# Patient Record
Sex: FEMALE | Race: WHITE | NOT HISPANIC OR LATINO | Employment: OTHER | URBAN - METROPOLITAN AREA
[De-identification: names, ages, dates, MRNs, and addresses within clinical notes are randomized per-mention and may not be internally consistent; named-entity substitution may affect disease eponyms.]

---

## 2019-12-19 ENCOUNTER — OFFICE VISIT (OUTPATIENT)
Dept: FAMILY MEDICINE CLINIC | Facility: CLINIC | Age: 69
End: 2019-12-19

## 2019-12-19 VITALS
BODY MASS INDEX: 21.94 KG/M2 | HEIGHT: 64 IN | SYSTOLIC BLOOD PRESSURE: 118 MMHG | OXYGEN SATURATION: 99 % | TEMPERATURE: 98 F | WEIGHT: 128.5 LBS | HEART RATE: 90 BPM | DIASTOLIC BLOOD PRESSURE: 70 MMHG

## 2019-12-19 DIAGNOSIS — R03.0 ELEVATED BLOOD-PRESSURE READING WITHOUT DIAGNOSIS OF HYPERTENSION: Primary | ICD-10-CM

## 2019-12-19 DIAGNOSIS — H65.01 NON-RECURRENT ACUTE SEROUS OTITIS MEDIA OF RIGHT EAR: ICD-10-CM

## 2019-12-19 DIAGNOSIS — R09.81 NASAL CONGESTION: ICD-10-CM

## 2019-12-19 PROCEDURE — 99203 OFFICE O/P NEW LOW 30 MIN: CPT | Performed by: FAMILY MEDICINE

## 2019-12-19 RX ORDER — FLUTICASONE PROPIONATE 50 MCG
2 SPRAY, SUSPENSION (ML) NASAL DAILY
Qty: 1 BOTTLE | Refills: 3 | Status: SHIPPED | OUTPATIENT
Start: 2019-12-19 | End: 2021-06-23

## 2019-12-19 RX ORDER — MULTIVIT WITH MINERALS/LUTEIN
1000 TABLET ORAL DAILY
COMMUNITY
End: 2022-12-08

## 2019-12-19 RX ORDER — MAGNESIUM ASCORBATE
POWDER (GRAM) MISCELLANEOUS
COMMUNITY

## 2019-12-19 RX ORDER — FOLIC ACID 0.8 MG
1000 TABLET ORAL
COMMUNITY
End: 2019-12-19

## 2019-12-19 NOTE — PROGRESS NOTES
Chief Complaint   Patient presents with   • Ear Fullness     some pain       Subjective   Zina Urban is a 69 y.o. female.     History of Present Illness     70 yo female here to establish care as a new patient     PMH of anemia and atrophic vaginitis. Not on meds. NKDA.      HM: C-scope 4 years ago with polyp adenoma, due for repeat. Normal mammogram in 2016, normal pap in 2015. No hx breast, colon, ovarian cancer. Non-smoker. Going through a divorce. 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     Had episode of elevated blood pressure with an ear infection, BL ears. SBP 150s. States BP rises when she has an infectino otherwise she is normotensive. Never had hx HTN, never been on a blood pressure medication.       Past Medical History:   Diagnosis Date   • Anemia        Past Surgical History:   Procedure Laterality Date   • OOPHORECTOMY         Family History   Problem Relation Age of Onset   • Heart failure Father    • Glaucoma Father    • Kidney disease Father    • Cancer Paternal Grandmother    • Hypertension Mother    • Depression Mother        Social History     Socioeconomic History   • Marital status:      Spouse name: Not on file   • Number of children: Not on file   • Years of education: Not on file   • Highest education level: Not on file   Tobacco Use   • Smoking status: Never Smoker   • Smokeless tobacco: Never Used   Substance and Sexual Activity   • Alcohol use: Yes     Alcohol/week: 3.0 standard drinks     Types: 3 Glasses of wine per week   • Drug use: No   • Sexual activity: Never       Current Outpatient Medications on File Prior to Visit   Medication Sig Dispense Refill   • ascorbic acid (VITAMIN C) 1000 MG tablet Take 1,000 mg by mouth Daily.     • aspirin 81 MG EC tablet Take 81 mg by mouth Daily. Taking once a week     • Magnesium Ascorbate powder      • Probiotic Product (PROBIOTIC PO) Take  by mouth.     • VITAMIN D PO Take  by mouth.     • [DISCONTINUED]  amoxicillin-clavulanate (AUGMENTIN) 875-125 MG per tablet Take 1 tablet by mouth 2 (Two) Times a Day. 20 tablet 0   • [DISCONTINUED] Magnesium 500 MG capsule Take 1,000 capsules by mouth.       No current facility-administered medications on file prior to visit.      The following portions of the patient's history were reviewed and updated as appropriate: allergies, current medications, past family history, past medical history, past social history, past surgical history and problem list.    Review of Systems   Constitutional: Negative for activity change, chills and fever.   HENT: Negative for congestion, postnasal drip and rhinorrhea.         R ear fullness   Eyes: Negative for blurred vision and pain.   Respiratory: Negative for cough, chest tightness and shortness of breath.    Cardiovascular: Negative for chest pain.   Gastrointestinal: Negative for abdominal pain, constipation, diarrhea, nausea and vomiting.   Endocrine: Negative for cold intolerance and heat intolerance.   Genitourinary: Negative for decreased urine volume, dysuria and frequency.   Musculoskeletal: Negative for arthralgias, back pain and myalgias.   Skin: Negative for rash and skin lesions.   Neurological: Negative for dizziness and confusion.   Psychiatric/Behavioral: Negative for agitation, behavioral problems and depressed mood.           Vitals:    12/19/19 1309   BP: 118/70   Pulse: 90   Temp: 98 °F (36.7 °C)   SpO2: 99%      Objective   Physical Exam   Constitutional: She is oriented to person, place, and time. She appears well-developed and well-nourished.   HENT:   Head: Normocephalic and atraumatic.   Right Ear: External ear normal.   Left Ear: External ear normal.   Nose: Nose normal.   Mouth/Throat: No oropharyngeal exudate.   Eyes: Pupils are equal, round, and reactive to light. Conjunctivae and EOM are normal.   Neck: Neck supple.   Cardiovascular: Normal rate, regular rhythm and normal heart sounds.   No murmur  heard.  Pulmonary/Chest: Effort normal and breath sounds normal. No respiratory distress.   Abdominal: Soft. Bowel sounds are normal.   Musculoskeletal: Normal range of motion.   Neurological: She is alert and oriented to person, place, and time.   Skin: Skin is warm and dry.   Psychiatric: She has a normal mood and affect.   Nursing note and vitals reviewed.        Assessment/Plan   Problem List Items Addressed This Visit     None      Visit Diagnoses     Nasal congestion    -  Primary    Relevant Medications    fluticasone (FLONASE) 50 MCG/ACT nasal spray    Non-recurrent acute serous otitis media of right ear        Relevant Medications    fluticasone (FLONASE) 50 MCG/ACT nasal spray    Elevated blood-pressure reading without diagnosis of hypertension            -HM: Due for labs as well as mammogram and c-scope, declining this today.       -Flonase for nasal congestion. Completed course of Augmentin, no otitis media on exam today. IF feeling of ear fullness does not continue to resolve will refer to ENT   -Monitor BP at home, normotensive today, will watch       Return if symptoms worsen or fail to improve.      Fidelina Richey MD

## 2020-06-19 ENCOUNTER — OFFICE VISIT (OUTPATIENT)
Dept: FAMILY MEDICINE CLINIC | Facility: CLINIC | Age: 70
End: 2020-06-19

## 2020-06-19 VITALS
BODY MASS INDEX: 23.73 KG/M2 | SYSTOLIC BLOOD PRESSURE: 135 MMHG | TEMPERATURE: 98.4 F | WEIGHT: 139 LBS | DIASTOLIC BLOOD PRESSURE: 80 MMHG | HEART RATE: 80 BPM | HEIGHT: 64 IN | OXYGEN SATURATION: 97 %

## 2020-06-19 DIAGNOSIS — D64.9 ANEMIA, UNSPECIFIED TYPE: ICD-10-CM

## 2020-06-19 DIAGNOSIS — Z00.00 ENCOUNTER FOR MEDICARE ANNUAL WELLNESS EXAM: Primary | ICD-10-CM

## 2020-06-19 DIAGNOSIS — R53.83 OTHER FATIGUE: ICD-10-CM

## 2020-06-19 DIAGNOSIS — Z12.31 ENCOUNTER FOR SCREENING MAMMOGRAM FOR MALIGNANT NEOPLASM OF BREAST: ICD-10-CM

## 2020-06-19 DIAGNOSIS — I10 ESSENTIAL HYPERTENSION: ICD-10-CM

## 2020-06-19 DIAGNOSIS — Z00.00 HEALTHCARE MAINTENANCE: ICD-10-CM

## 2020-06-19 DIAGNOSIS — E78.2 MIXED HYPERLIPIDEMIA: ICD-10-CM

## 2020-06-19 LAB
BILIRUB BLD-MCNC: NEGATIVE MG/DL
CLARITY, POC: CLEAR
COLOR UR: YELLOW
GLUCOSE UR STRIP-MCNC: NEGATIVE MG/DL
KETONES UR QL: NEGATIVE
LEUKOCYTE EST, POC: NEGATIVE
NITRITE UR-MCNC: NEGATIVE MG/ML
PH UR: 5 [PH] (ref 5–8)
PROT UR STRIP-MCNC: NEGATIVE MG/DL
RBC # UR STRIP: NEGATIVE /UL
SP GR UR: 1.02 (ref 1–1.03)
UROBILINOGEN UR QL: NORMAL

## 2020-06-19 PROCEDURE — G0439 PPPS, SUBSEQ VISIT: HCPCS | Performed by: FAMILY MEDICINE

## 2020-06-19 PROCEDURE — 81002 URINALYSIS NONAUTO W/O SCOPE: CPT | Performed by: FAMILY MEDICINE

## 2020-06-19 NOTE — PROGRESS NOTES
The ABCs of the Annual Wellness Visit  Subsequent Medicare Wellness Visit    Chief Complaint   Patient presents with   • Annual Exam     Sub AWV       Subjective   History of Present Illness:  Zina Urban is a 69 y.o. female who presents for a Subsequent Medicare Wellness Visit.      70 yo female here for AMW visit     HM: Due for labs as well as mammogram and c-scope     PMH of anemia and atrophic vaginitis.  Also has a history of hyperlipidemia not on medication.  Has had prn elevated blood pressures but never been on a blood pressure medication.  She is typically normotensive.  Not on meds. NKDA.    HM: C-scope 5 years ago with polyp tubular adenoma, due for repeat now.  She declines any mammogram.  She also declines a colonoscopy.  She is going through a stressful time with her divorce and does not want any of these preventative measures at this time.  She does not want any prescription medications either.    Last normal mammogram in 2016, normal pap in 2015. No hx breast, colon, ovarian cancer. Non-smoker. Going through a divorce. 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     Blood pressure today was mildly elevated initially 140/80 repeat check was 135 over 80s.    HEALTH RISK ASSESSMENT    Recent Hospitalizations:  No hospitalization(s) within the last year.    Current Medical Providers:  Patient Care Team:  Fidelina Richey MD as PCP - General (Family Medicine)    Smoking Status:  Social History     Tobacco Use   Smoking Status Never Smoker   Smokeless Tobacco Never Used       Alcohol Consumption:  Social History     Substance and Sexual Activity   Alcohol Use Yes   • Alcohol/week: 3.0 standard drinks   • Types: 3 Glasses of wine per week       Depression Screen:   PHQ-2/PHQ-9 Depression Screening 6/19/2020   Little interest or pleasure in doing things 1   Feeling down, depressed, or hopeless 1   Trouble falling or staying asleep, or sleeping too much 0   Feeling tired or having little energy 0    Poor appetite or overeating 2   Feeling bad about yourself - or that you are a failure or have let yourself or your family down 0   Trouble concentrating on things, such as reading the newspaper or watching television 0   Moving or speaking so slowly that other people could have noticed. Or the opposite - being so fidgety or restless that you have been moving around a lot more than usual 0   Thoughts that you would be better off dead, or of hurting yourself in some way 0   Total Score 4   If you checked off any problems, how difficult have these problems made it for you to do your work, take care of things at home, or get along with other people? Not difficult at all       Fall Risk Screen:  DESIRE Fall Risk Assessment was completed, and patient is at LOW risk for falls.Assessment completed on:6/19/2020    Health Habits and Functional and Cognitive Screening:  Functional & Cognitive Status 6/19/2020   Do you have difficulty preparing food and eating? No   Do you have difficulty bathing yourself, getting dressed or grooming yourself? No   Do you have difficulty using the toilet? No   Do you have difficulty moving around from place to place? No   Do you have trouble with steps or getting out of a bed or a chair? No   Current Diet Well Balanced Diet   Dental Exam Up to date   Eye Exam Up to date   Exercise (times per week) 7 times per week   Current Exercise Activities Include Walking   Do you need help using the phone?  No   Are you deaf or do you have serious difficulty hearing?  No   Do you need help with transportation? No   Do you need help shopping? No   Do you need help preparing meals?  No   Do you need help with housework?  No   Do you need help with laundry? No   Do you need help taking your medications? No   Do you need help managing money? No   Do you ever drive or ride in a car without wearing a seat belt? No   Have you felt unusual stress, anger or loneliness in the last month? Yes   Who do you live  with? Alone   If you need help, do you have trouble finding someone available to you? No   Do you have difficulty concentrating, remembering or making decisions? No         Does the patient have evidence of cognitive impairment? No    Asprin use counseling:Taking ASA appropriately as indicated    Age-appropriate Screening Schedule:  Refer to the list below for future screening recommendations based on patient's age, sex and/or medical conditions. Orders for these recommended tests are listed in the plan section. The patient has been provided with a written plan.    Health Maintenance   Topic Date Due   • MAMMOGRAM  1950   • LIPID PANEL  06/19/2020   • TDAP/TD VACCINES (2 - Td) 04/17/2027   • COLONOSCOPY  05/03/2028   • INFLUENZA VACCINE  Discontinued   • ZOSTER VACCINE  Discontinued          The following portions of the patient's history were reviewed and updated as appropriate: problem list.    Outpatient Medications Prior to Visit   Medication Sig Dispense Refill   • ascorbic acid (VITAMIN C) 1000 MG tablet Take 1,000 mg by mouth Daily.     • aspirin 81 MG EC tablet Take 81 mg by mouth Daily. Taking once a week     • fluticasone (FLONASE) 50 MCG/ACT nasal spray 2 sprays into the nostril(s) as directed by provider Daily. 1 bottle 3   • Magnesium Ascorbate powder      • Probiotic Product (PROBIOTIC PO) Take  by mouth.     • VITAMIN D PO Take  by mouth.       No facility-administered medications prior to visit.        There is no problem list on file for this patient.      Advanced Care Planning:  ACP discussion was declined by the patient. Patient does not have an advance directive, information provided.    Review of Systems   Constitutional: Negative for chills and fever.   HENT: Negative for congestion.    Respiratory: Negative for chest tightness and wheezing.    Cardiovascular: Negative for chest pain and palpitations.   Gastrointestinal: Negative for abdominal pain, nausea and vomiting.   Endocrine:  "Negative for polyuria.   Genitourinary: Negative for frequency, genital sores and vaginal discharge.   Musculoskeletal: Negative for arthralgias.   Skin: Negative for rash.   Neurological: Negative for dizziness and weakness.   Psychiatric/Behavioral: Negative for agitation, decreased concentration and dysphoric mood.       Compared to one year ago, the patient feels her physical health is the same.  Compared to one year ago, the patient feels her mental health is the same.    Reviewed chart for potential of high risk medication in the elderly: yes  Reviewed chart for potential of harmful drug interactions in the elderly:yes    Objective         Vitals:    06/19/20 1121 06/19/20 1145   BP: 142/82 135/80   Pulse: 81 80   Temp: 98.4 °F (36.9 °C)    SpO2: 97%    Weight: 63 kg (139 lb)    Height: 162.6 cm (64\")        Body mass index is 23.86 kg/m².  Discussed the patient's BMI with her. The BMI is in the acceptable range.    Physical Exam   Constitutional: She is oriented to person, place, and time. She appears well-developed and well-nourished. No distress.   HENT:   Head: Normocephalic.   Right Ear: External ear normal.   Left Ear: External ear normal.   Mouth/Throat: Oropharynx is clear and moist.   Eyes: Pupils are equal, round, and reactive to light. EOM are normal.   Cardiovascular: Normal rate and regular rhythm.   Pulmonary/Chest: Effort normal and breath sounds normal. No respiratory distress. She has no wheezes.   Abdominal: Soft. Bowel sounds are normal. She exhibits no distension.   Musculoskeletal: Normal range of motion.   Neurological: She is alert and oriented to person, place, and time.   Skin: Skin is warm and dry. Capillary refill takes less than 2 seconds. She is not diaphoretic.             Assessment/Plan   Medicare Risks and Personalized Health Plan  CMS Preventative Services Quick Reference  Chronic Pain   Colon Cancer Screening  Dementia/Memory   Depression/Dysphoria  Fall Risk  Immunizations " Discussed/Encouraged (specific immunizations; Shingrix )  Inadequate Social Support, Isolation, Loneliness, Lack of Transportation, Financial Difficulties, or Caregiver Stress   Inactivity/Sedentary  Obesity/Overweight   Osteoprorosis Risk    The above risks/problems have been discussed with the patient.  Pertinent information has been shared with the patient in the After Visit Summary.  Follow up plans and orders are seen below in the Assessment/Plan Section.    Diagnoses and all orders for this visit:    1. Encounter for Medicare annual wellness exam (Primary)    2. Mixed hyperlipidemia  -     Vitamin B12  -     Folate  -     CBC & Differential  -     Comprehensive metabolic panel  -     Lipid panel  -     T4  -     TSH    3. Other fatigue  -     Vitamin B12  -     Folate  -     CBC & Differential  -     Comprehensive metabolic panel  -     Lipid panel  -     T4  -     TSH    4. Anemia, unspecified type  -     Vitamin B12  -     Folate  -     CBC & Differential  -     Comprehensive metabolic panel  -     Lipid panel  -     T4  -     TSH    5. Essential hypertension  -     Vitamin B12  -     Folate  -     CBC & Differential  -     Comprehensive metabolic panel  -     Lipid panel  -     T4  -     TSH    6. Healthcare maintenance  -     POC Urinalysis Dipstick      Follow Up:  We will get labs has not had labs in over several years.  Also having some chronic complaints of fatigue and the past history of anemia and hyperlipidemia.  She has also had 1 or 2 mildly elevated blood pressures without a diagnosis of hypertension.  On recheck she was normotensive.    She declines colonoscopy, mammogram, any prescription medications.    She is going through a hard time with her divorce.  She tries to stay socially active and physically active by walking every day and eating and healthy diet.  She visits with her grandchildren at least once a week.  Her mood is stable.    An After Visit Summary and PPPS were given to the  patient.

## 2020-06-19 NOTE — PROGRESS NOTES
ASA        70 yo female here to establish care as a new patient     HM: Due for labs as well as mammogram and c-scope, declining this today.     PMH of anemia and atrophic vaginitis. Not on meds. NKDA    HM: C-scope 4 years ago with polyp adenoma, due for repeat. Normal mammogram in 2016, normal pap in 2015. No hx breast, colon, ovarian cancer. Non-smoker. Going through a divorce. 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        Hx elevated BP without HTN

## 2020-06-20 LAB
ALBUMIN SERPL-MCNC: 4.5 G/DL (ref 3.5–5.2)
ALBUMIN/GLOB SERPL: 2 G/DL
ALP SERPL-CCNC: 64 U/L (ref 39–117)
ALT SERPL-CCNC: 23 U/L (ref 1–33)
AST SERPL-CCNC: 25 U/L (ref 1–32)
BASOPHILS # BLD AUTO: 0.05 10*3/MM3 (ref 0–0.2)
BASOPHILS NFR BLD AUTO: 0.7 % (ref 0–1.5)
BILIRUB SERPL-MCNC: 0.4 MG/DL (ref 0.2–1.2)
BUN SERPL-MCNC: 14 MG/DL (ref 8–23)
BUN/CREAT SERPL: 19.7 (ref 7–25)
CALCIUM SERPL-MCNC: 9.5 MG/DL (ref 8.6–10.5)
CHLORIDE SERPL-SCNC: 101 MMOL/L (ref 98–107)
CHOLEST SERPL-MCNC: 273 MG/DL (ref 0–200)
CO2 SERPL-SCNC: 27.8 MMOL/L (ref 22–29)
CREAT SERPL-MCNC: 0.71 MG/DL (ref 0.57–1)
EOSINOPHIL # BLD AUTO: 0.11 10*3/MM3 (ref 0–0.4)
EOSINOPHIL NFR BLD AUTO: 1.5 % (ref 0.3–6.2)
ERYTHROCYTE [DISTWIDTH] IN BLOOD BY AUTOMATED COUNT: 12.8 % (ref 12.3–15.4)
FOLATE SERPL-MCNC: 12.6 NG/ML (ref 4.78–24.2)
GLOBULIN SER CALC-MCNC: 2.2 GM/DL
GLUCOSE SERPL-MCNC: 88 MG/DL (ref 65–99)
HCT VFR BLD AUTO: 39.9 % (ref 34–46.6)
HDLC SERPL-MCNC: 93 MG/DL (ref 40–60)
HGB BLD-MCNC: 13.1 G/DL (ref 12–15.9)
IMM GRANULOCYTES # BLD AUTO: 0.01 10*3/MM3 (ref 0–0.05)
IMM GRANULOCYTES NFR BLD AUTO: 0.1 % (ref 0–0.5)
LDLC SERPL CALC-MCNC: 169 MG/DL (ref 0–100)
LYMPHOCYTES # BLD AUTO: 1.59 10*3/MM3 (ref 0.7–3.1)
LYMPHOCYTES NFR BLD AUTO: 21.2 % (ref 19.6–45.3)
MCH RBC QN AUTO: 27.6 PG (ref 26.6–33)
MCHC RBC AUTO-ENTMCNC: 32.8 G/DL (ref 31.5–35.7)
MCV RBC AUTO: 84.2 FL (ref 79–97)
MONOCYTES # BLD AUTO: 0.5 10*3/MM3 (ref 0.1–0.9)
MONOCYTES NFR BLD AUTO: 6.7 % (ref 5–12)
NEUTROPHILS # BLD AUTO: 5.24 10*3/MM3 (ref 1.7–7)
NEUTROPHILS NFR BLD AUTO: 69.8 % (ref 42.7–76)
NRBC BLD AUTO-RTO: 0 /100 WBC (ref 0–0.2)
PLATELET # BLD AUTO: 236 10*3/MM3 (ref 140–450)
POTASSIUM SERPL-SCNC: 4 MMOL/L (ref 3.5–5.2)
PROT SERPL-MCNC: 6.7 G/DL (ref 6–8.5)
RBC # BLD AUTO: 4.74 10*6/MM3 (ref 3.77–5.28)
SODIUM SERPL-SCNC: 139 MMOL/L (ref 136–145)
T4 SERPL-MCNC: 6.83 MCG/DL (ref 4.5–11.7)
TRIGL SERPL-MCNC: 56 MG/DL (ref 0–150)
TSH SERPL DL<=0.005 MIU/L-ACNC: 1.38 UIU/ML (ref 0.27–4.2)
VIT B12 SERPL-MCNC: 626 PG/ML (ref 211–946)
VLDLC SERPL CALC-MCNC: 11.2 MG/DL
WBC # BLD AUTO: 7.5 10*3/MM3 (ref 3.4–10.8)

## 2020-06-24 ENCOUNTER — TELEPHONE (OUTPATIENT)
Dept: FAMILY MEDICINE CLINIC | Facility: CLINIC | Age: 70
End: 2020-06-24

## 2020-12-18 ENCOUNTER — OFFICE VISIT (OUTPATIENT)
Dept: FAMILY MEDICINE CLINIC | Facility: CLINIC | Age: 70
End: 2020-12-18

## 2020-12-18 VITALS
TEMPERATURE: 98.2 F | DIASTOLIC BLOOD PRESSURE: 80 MMHG | SYSTOLIC BLOOD PRESSURE: 136 MMHG | BODY MASS INDEX: 25.2 KG/M2 | WEIGHT: 142.2 LBS | OXYGEN SATURATION: 99 % | HEART RATE: 78 BPM | HEIGHT: 63 IN

## 2020-12-18 DIAGNOSIS — Z12.31 SCREENING MAMMOGRAM, ENCOUNTER FOR: ICD-10-CM

## 2020-12-18 DIAGNOSIS — E78.2 MIXED HYPERLIPIDEMIA: Primary | ICD-10-CM

## 2020-12-18 DIAGNOSIS — I10 ESSENTIAL HYPERTENSION: ICD-10-CM

## 2020-12-18 PROCEDURE — 99214 OFFICE O/P EST MOD 30 MIN: CPT | Performed by: FAMILY MEDICINE

## 2020-12-18 NOTE — PROGRESS NOTES
Chief Complaint   Patient presents with   • Hyperlipidemia   • Hypertension       Subjective   Zina Urban is a 70 y.o. female.     History of Present Illness   70-year-old female here for follow-up on hyperlipidemia as well as several episodes of elevated blood pressure    HLD: TCholesterol 273/.  She thinks likely genetic component as she eats really well.  Avoids animal needs.  Avoids saturated fats.  Tries to eat healthy and exercise daily.  She declines wanting a statin medication at this time.    HTN: Normotensive today and asymptomatic off of any medication.  Has been stable     She is going through a hard time with her divorce.  She tries to stay socially active and physically active by walking every day and eating and healthy diet.  She visits with her grandchildren at least once a week.  Her mood is stable.    HM: C-scope 5 years ago with polyp tubular adenoma, due for repeat now.  She declines any mammogram.  She also declines a colonoscopy.  Last normal mammogram in 2016, normal pap in 2015. No hx breast, colon, ovarian cancer. Non-smoker.       The 10-year ASCVD risk score (Alpine ROMMEL Jr., et al., 2013) is: 13.2%    Values used to calculate the score:      Age: 70 years      Sex: Female      Is Non- : No      Diabetic: No      Tobacco smoker: No      Systolic Blood Pressure: 136 mmHg      Is BP treated: No      HDL Cholesterol: 93 mg/dL      Total Cholesterol: 273 mg/dL    The following portions of the patient's history were reviewed and updated as appropriate: allergies, current medications, past family history, past medical history, past social history, past surgical history and problem list.      Past Medical History:   Diagnosis Date   • Anemia        Past Surgical History:   Procedure Laterality Date   • OOPHORECTOMY         Family History   Problem Relation Age of Onset   • Heart failure Father    • Glaucoma Father    • Kidney disease Father    • Cancer  Paternal Grandmother    • Hypertension Mother    • Depression Mother        Social History     Socioeconomic History   • Marital status:      Spouse name: Not on file   • Number of children: Not on file   • Years of education: Not on file   • Highest education level: Not on file   Tobacco Use   • Smoking status: Never Smoker   • Smokeless tobacco: Never Used   Substance and Sexual Activity   • Alcohol use: Yes     Alcohol/week: 3.0 standard drinks     Types: 3 Glasses of wine per week   • Drug use: No   • Sexual activity: Never       Current Outpatient Medications on File Prior to Visit   Medication Sig Dispense Refill   • ascorbic acid (VITAMIN C) 1000 MG tablet Take 1,000 mg by mouth Daily.     • aspirin 81 MG EC tablet Take 81 mg by mouth Daily. Taking once a week     • fluticasone (FLONASE) 50 MCG/ACT nasal spray 2 sprays into the nostril(s) as directed by provider Daily. 1 bottle 3   • Magnesium Ascorbate powder      • Probiotic Product (PROBIOTIC PO) Take  by mouth.     • TURMERIC PO Take  by mouth.     • VITAMIN D PO Take  by mouth.     • Zinc Sulfate (ZINC 15 PO) Take  by mouth.       No current facility-administered medications on file prior to visit.        Review of Systems   Constitutional: Negative for activity change, chills and fever.   HENT: Negative for congestion.    Eyes: Negative for blurred vision and pain.   Respiratory: Negative for cough, chest tightness and shortness of breath.    Cardiovascular: Negative for chest pain.   Gastrointestinal: Negative for abdominal pain, constipation, diarrhea, nausea and vomiting.   Endocrine: Negative for cold intolerance and heat intolerance.   Genitourinary: Negative for decreased urine volume, dysuria and frequency.   Musculoskeletal: Negative for arthralgias, back pain and myalgias.   Skin: Negative for rash and skin lesions.   Neurological: Negative for dizziness and confusion.   Psychiatric/Behavioral: Negative for agitation, behavioral problems  and depressed mood.           Vitals:    12/18/20 1056   BP: 136/80   Pulse: 78   Temp: 98.2 °F (36.8 °C)   SpO2: 99%      Objective   Physical Exam  Vitals signs and nursing note reviewed.   Constitutional:       Appearance: She is well-developed.   HENT:      Head: Normocephalic and atraumatic.   Eyes:      Conjunctiva/sclera: Conjunctivae normal.      Pupils: Pupils are equal, round, and reactive to light.   Neck:      Musculoskeletal: Neck supple.   Cardiovascular:      Rate and Rhythm: Normal rate and regular rhythm.      Heart sounds: Normal heart sounds. No murmur.   Pulmonary:      Effort: Pulmonary effort is normal. No respiratory distress.      Breath sounds: Normal breath sounds.   Abdominal:      General: Bowel sounds are normal.      Palpations: Abdomen is soft.   Musculoskeletal: Normal range of motion.   Skin:     General: Skin is warm and dry.   Neurological:      Mental Status: She is alert and oriented to person, place, and time.           Assessment/Plan   Problems Addressed this Visit     None      Visit Diagnoses     Mixed hyperlipidemia    -  Primary    Single episode of elevated blood pressure        Screening mammogram, encounter for          Diagnoses       Codes Comments    Mixed hyperlipidemia    -  Primary ICD-10-CM: E78.2  ICD-9-CM: 272.2     Single episode of elevated blood pressure     ICD-10-CM: R03.0  ICD-9-CM: 796.2     Screening mammogram, encounter for     ICD-10-CM: Z12.31  ICD-9-CM: V76.12         -Likely genetically high cholesterol, has excellent diet and working to exercise more frequently   -Declines screening measures like mammogram or colonoscopy at this time  -Blood pressure has more recently been in the normal range  -Not interested in considering a statin medication etc.  -She can follow-up for her next wellness visit         Fidelina Richey MD

## 2021-01-14 ENCOUNTER — OFFICE VISIT (OUTPATIENT)
Dept: FAMILY MEDICINE CLINIC | Facility: CLINIC | Age: 71
End: 2021-01-14

## 2021-01-14 ENCOUNTER — TELEPHONE (OUTPATIENT)
Dept: FAMILY MEDICINE CLINIC | Facility: CLINIC | Age: 71
End: 2021-01-14

## 2021-01-14 VITALS
OXYGEN SATURATION: 99 % | BODY MASS INDEX: 25.05 KG/M2 | DIASTOLIC BLOOD PRESSURE: 80 MMHG | SYSTOLIC BLOOD PRESSURE: 120 MMHG | HEART RATE: 75 BPM | TEMPERATURE: 98.1 F | HEIGHT: 63 IN | WEIGHT: 141.4 LBS

## 2021-01-14 DIAGNOSIS — H60.8X2 CHRONIC ECZEMATOUS OTITIS EXTERNA OF LEFT EAR: Primary | ICD-10-CM

## 2021-01-14 DIAGNOSIS — H26.8 OTHER CATARACT OF BOTH EYES: ICD-10-CM

## 2021-01-14 DIAGNOSIS — H91.93 BILATERAL HEARING LOSS, UNSPECIFIED HEARING LOSS TYPE: ICD-10-CM

## 2021-01-14 PROCEDURE — 99214 OFFICE O/P EST MOD 30 MIN: CPT | Performed by: FAMILY MEDICINE

## 2021-01-14 RX ORDER — FLUOCINOLONE ACETONIDE 0.11 MG/ML
OIL AURICULAR (OTIC) 2 TIMES DAILY
Qty: 20 ML | Refills: 0 | Status: SHIPPED | OUTPATIENT
Start: 2021-01-14 | End: 2021-01-24

## 2021-01-14 NOTE — PROGRESS NOTES
Chief Complaint   Patient presents with   • Hearing Problem       Subjective   Zina Urban is a 70 y.o. female.     History of Present Illness   70-year-old female here for concern for hearing loss as well as ome itchiness of her left ear    Also some concern for blurred vision and wanting cataract evaluation per her optometrist     Itchiness of her ear:  She denies any pain.  She only notices it when she is talking on the phone on that side.  This is been going on for several weeks.  Never had a similar issue.  No history of cerumen impaction or ear infections.      In addition to the itching of the ear her daughter has noted that she has had some trouble hearing.  Trouble hearing has been occurring bilaterally.  She has never had hearing testing.  Daughter is recommending that she go to audiology for testing for concern for hearing loss.  Daughter often has to repeat things as her mom will misinterpret what she is saying even though she is talking in close range.    Pt notes some blurriness of her eyes in spite of appropriate eyewear.  She is concerned for possible cataracts.  She is not having any pain.  She said that her optometrist suggested an evaluation by an ophthalmologist but she has never seen them before.  She does state that she would not like to see Alexia.      The following portions of the patient's history were reviewed and updated as appropriate: allergies, current medications, past family history, past medical history, past social history, past surgical history and problem list.      Past Medical History:   Diagnosis Date   • Anemia        Past Surgical History:   Procedure Laterality Date   • OOPHORECTOMY         Family History   Problem Relation Age of Onset   • Heart failure Father    • Glaucoma Father    • Kidney disease Father    • Cancer Paternal Grandmother    • Hypertension Mother    • Depression Mother        Social History     Socioeconomic History   • Marital  status:      Spouse name: Not on file   • Number of children: Not on file   • Years of education: Not on file   • Highest education level: Not on file   Tobacco Use   • Smoking status: Never Smoker   • Smokeless tobacco: Never Used   Substance and Sexual Activity   • Alcohol use: Yes     Alcohol/week: 3.0 standard drinks     Types: 3 Glasses of wine per week   • Drug use: No   • Sexual activity: Never       Current Outpatient Medications on File Prior to Visit   Medication Sig Dispense Refill   • ascorbic acid (VITAMIN C) 1000 MG tablet Take 1,000 mg by mouth Daily.     • aspirin 81 MG EC tablet Take 81 mg by mouth Daily. Taking once a week     • fluticasone (FLONASE) 50 MCG/ACT nasal spray 2 sprays into the nostril(s) as directed by provider Daily. 1 bottle 3   • Magnesium Ascorbate powder      • Probiotic Product (PROBIOTIC PO) Take  by mouth.     • TURMERIC PO Take  by mouth.     • VITAMIN D PO Take  by mouth.     • Zinc Sulfate (ZINC 15 PO) Take  by mouth.       No current facility-administered medications on file prior to visit.        Review of Systems   Constitutional: Negative for fatigue and fever.   HENT: Positive for ear pain and hearing loss. Negative for ear discharge and sore throat.    Eyes: Positive for blurred vision.   Respiratory: Negative for cough.    Cardiovascular: Negative for chest pain.   Gastrointestinal: Negative for abdominal pain, diarrhea and vomiting.   Endocrine: Negative for cold intolerance.   Genitourinary: Negative for decreased urine volume.   Musculoskeletal: Positive for neck pain. Negative for arthralgias.   Skin: Negative for rash.   Neurological: Negative for dizziness, weakness and confusion.   Psychiatric/Behavioral: Negative for dysphoric mood.           Vitals:    01/14/21 1132   BP: 120/80   Pulse: 75   Temp: 98.1 °F (36.7 °C)   SpO2: 99%      Objective   Physical Exam  Vitals signs and nursing note reviewed.   Constitutional:       Appearance: She is  well-developed.   HENT:      Head: Normocephalic and atraumatic.   Eyes:      Conjunctiva/sclera: Conjunctivae normal.      Pupils: Pupils are equal, round, and reactive to light.   Neck:      Musculoskeletal: Neck supple.   Cardiovascular:      Rate and Rhythm: Normal rate and regular rhythm.      Heart sounds: Normal heart sounds. No murmur.   Pulmonary:      Effort: Pulmonary effort is normal. No respiratory distress.      Breath sounds: Normal breath sounds.   Abdominal:      General: Bowel sounds are normal.      Palpations: Abdomen is soft.   Musculoskeletal: Normal range of motion.   Skin:     General: Skin is warm and dry.   Neurological:      Mental Status: She is alert and oriented to person, place, and time.     dry mildly irritated skin L external ear canal      Assessment/Plan   Problems Addressed this Visit     None      Visit Diagnoses     Chronic eczematous otitis externa of left ear    -  Primary    Relevant Medications    fluocinolone acetonide (DERMOTIC) 0.01 % oil otic oil    Bilateral hearing loss, unspecified hearing loss type        Relevant Orders    Ambulatory Referral to Audiology    Other cataract of both eyes        Relevant Orders    Ambulatory Referral to Ophthalmology      Diagnoses       Codes Comments    Chronic eczematous otitis externa of left ear    -  Primary ICD-10-CM: H60.8X2  ICD-9-CM: 380.23     Bilateral hearing loss, unspecified hearing loss type     ICD-10-CM: H91.93  ICD-9-CM: 389.9     Other cataract of both eyes     ICD-10-CM: H26.8  ICD-9-CM: 366.19           -Referring to Dr. Webster to evaluate for possible cataracts, referral placed    -We will also place referral for audiology.  May try a steroid eardrop in the left ear as she is having some flaking eczema of his skin of the external ear canal as well/let us know the itching does not improve with this treatment       Fidelina Richey MD

## 2021-01-14 NOTE — TELEPHONE ENCOUNTER
Caller: Zina Urban    Relationship: Self    Best call back number: [phone]    PATIENT SAW DR. MARTIN THIS MORNING, 01/14/21 FOR HEARING ISSUES, HAD SAID SHE WAS GOING TO [facility] TO BE CHECKED FOR HEARING    PATIENT CHECKED WITH [facility], THEY DO NOT HAVE AN AUDIOLOGIST ON STAFF, AND CAN ONLY ASSIST WITH HEARING AIDES.    THE PATIENT WOULD LIKE A REFERRAL PLACED FOR THE AUDIOLOGIST ON [address], THAT DR. MARTIN HAD RECOMMENDED    PATIENT WOULD LIKE A CALLBACK

## 2021-01-27 ENCOUNTER — OFFICE VISIT (OUTPATIENT)
Dept: FAMILY MEDICINE CLINIC | Facility: CLINIC | Age: 71
End: 2021-01-27

## 2021-01-27 VITALS
OXYGEN SATURATION: 98 % | HEART RATE: 76 BPM | HEIGHT: 63 IN | DIASTOLIC BLOOD PRESSURE: 76 MMHG | RESPIRATION RATE: 16 BRPM | SYSTOLIC BLOOD PRESSURE: 140 MMHG | WEIGHT: 143 LBS | TEMPERATURE: 97.6 F | BODY MASS INDEX: 25.34 KG/M2

## 2021-01-27 DIAGNOSIS — Z91.89: ICD-10-CM

## 2021-01-27 DIAGNOSIS — K59.09 OTHER CONSTIPATION: Primary | ICD-10-CM

## 2021-01-27 PROCEDURE — 99214 OFFICE O/P EST MOD 30 MIN: CPT | Performed by: FAMILY MEDICINE

## 2021-01-27 RX ORDER — POLYETHYLENE GLYCOL 3350 17 G/17G
17 POWDER, FOR SOLUTION ORAL DAILY
Status: CANCELLED | OUTPATIENT
Start: 2021-01-27

## 2021-01-27 RX ORDER — CIPROFLOXACIN HYDROCHLORIDE 3.5 MG/ML
1 SOLUTION/ DROPS TOPICAL
Status: CANCELLED | OUTPATIENT
Start: 2021-01-27

## 2021-01-27 RX ORDER — POLYETHYLENE GLYCOL 3350 17 G/17G
17 POWDER, FOR SOLUTION ORAL 2 TIMES DAILY
Qty: 289 G | Refills: 1 | Status: SHIPPED | OUTPATIENT
Start: 2021-01-27 | End: 2021-06-23

## 2021-01-27 NOTE — PROGRESS NOTES
herve stahl [facility]     Chief Complaint   Patient presents with   • Eczema       Subjective   Zina Urban is a 70 y.o. female.     History of Present Illness   70-year-old female here for follow-up on blurry eyes    Blurry eyes: She was sent to Dr. Tunde Webster ophthalmology and told she had narrow angle glaucoma and needed laser treatment.  She is scheduled to see him this afternoon.  She would also need cataract removal which would not be covered by Medicare and she is concerned about this cost.  Has been having intermittent episodes of blurry vision on and off for the past month so is relatively new issue for her. Currently asymptomatic.     Also with constipation for the past several months.  Even tried glycerin suppositories and Fleets enema tends to have harder stools once a day.  No recent colonoscopy and tends to avoid colonoscopies and other preventative care. No abd pain or other associated symptoms       The following portions of the patient's history were reviewed and updated as appropriate: allergies, current medications, past family history, past medical history, past social history, past surgical history and problem list.      Past Medical History:   Diagnosis Date   • Anemia        Past Surgical History:   Procedure Laterality Date   • OOPHORECTOMY         Family History   Problem Relation Age of Onset   • Heart failure Father    • Glaucoma Father    • Kidney disease Father    • Cancer Paternal Grandmother    • Hypertension Mother    • Depression Mother        Social History     Socioeconomic History   • Marital status:      Spouse name: Not on file   • Number of children: Not on file   • Years of education: Not on file   • Highest education level: Not on file   Tobacco Use   • Smoking status: Never Smoker   • Smokeless tobacco: Never Used   Substance and Sexual Activity   • Alcohol use: Yes     Alcohol/week: 3.0 standard drinks     Types: 3 Glasses of wine per week   • Drug  use: No   • Sexual activity: Never       Current Outpatient Medications on File Prior to Visit   Medication Sig Dispense Refill   • ascorbic acid (VITAMIN C) 1000 MG tablet Take 1,000 mg by mouth Daily.     • aspirin 81 MG EC tablet Take 81 mg by mouth Daily. Taking once a week     • fluticasone (FLONASE) 50 MCG/ACT nasal spray 2 sprays into the nostril(s) as directed by provider Daily. 1 bottle 3   • Magnesium Ascorbate powder      • Probiotic Product (PROBIOTIC PO) Take  by mouth.     • TURMERIC PO Take  by mouth.     • VITAMIN D PO Take  by mouth.     • Zinc Sulfate (ZINC 15 PO) Take  by mouth.       No current facility-administered medications on file prior to visit.        Review of Systems   Constitutional: Negative for activity change, chills and fever.   HENT: Negative for congestion, postnasal drip and rhinorrhea.    Eyes: Positive for blurred vision. Negative for pain.   Respiratory: Negative for cough, chest tightness and shortness of breath.    Cardiovascular: Negative for chest pain.   Gastrointestinal: Negative for abdominal pain, constipation, diarrhea, nausea and vomiting.   Endocrine: Negative for cold intolerance and heat intolerance.   Genitourinary: Negative for decreased urine volume, dysuria and frequency.   Musculoskeletal: Negative for arthralgias, back pain and myalgias.   Skin: Negative for rash and skin lesions.   Neurological: Negative for dizziness and confusion.   Psychiatric/Behavioral: Negative for agitation, behavioral problems and depressed mood.           Vitals:    01/27/21 0944   BP: 140/76   Pulse: 76   Resp: 16   Temp: 97.6 °F (36.4 °C)   SpO2: 98%      Objective   Physical Exam  Vitals signs and nursing note reviewed.   Constitutional:       Appearance: She is well-developed.   HENT:      Head: Normocephalic and atraumatic.   Eyes:      Conjunctiva/sclera: Conjunctivae normal.      Pupils: Pupils are equal, round, and reactive to light.   Neck:      Musculoskeletal: Neck  supple.   Cardiovascular:      Rate and Rhythm: Normal rate and regular rhythm.      Heart sounds: Normal heart sounds. No murmur.   Pulmonary:      Effort: Pulmonary effort is normal. No respiratory distress.      Breath sounds: Normal breath sounds.   Abdominal:      General: Bowel sounds are normal.      Palpations: Abdomen is soft.   Musculoskeletal: Normal range of motion.   Skin:     General: Skin is warm and dry.   Neurological:      Mental Status: She is alert and oriented to person, place, and time.           Assessment/Plan   Problems Addressed this Visit     None      Visit Diagnoses     Other constipation    -  Primary    Relevant Medications    polyethylene glycol (MiraLax) 17 GM/SCOOP powder    At risk for narrow angle glaucoma          Diagnoses       Codes Comments    Other constipation    -  Primary ICD-10-CM: K59.09  ICD-9-CM: 564.09     At risk for narrow angle glaucoma     ICD-10-CM: Z91.89  ICD-9-CM: 365.02         -We will have her address the concern for more emergent narrow angle glaucoma first  -Second opinion set up with Dr Hughes with [facility], encouraged her to get the recommended laser treatment for this complaint has appointment this afternoon, pt agreeable    -Will start on twice daily Miralax and increase hydration, f/u 1 week, may need to consider C-scope given the change in bowel habit. Benign abd exam           Fidelina Richey MD

## 2021-06-08 DIAGNOSIS — R53.83 OTHER FATIGUE: ICD-10-CM

## 2021-06-08 DIAGNOSIS — E78.2 MIXED HYPERLIPIDEMIA: Primary | ICD-10-CM

## 2021-06-08 DIAGNOSIS — I10 ESSENTIAL HYPERTENSION: ICD-10-CM

## 2021-06-08 DIAGNOSIS — Z00.00 ANNUAL PHYSICAL EXAM: ICD-10-CM

## 2021-06-12 LAB
BASOPHILS # BLD AUTO: 0.08 10*3/MM3 (ref 0–0.2)
BASOPHILS NFR BLD AUTO: 1.4 % (ref 0–1.5)
BUN SERPL-MCNC: 13 MG/DL (ref 8–23)
BUN/CREAT SERPL: 18.8 (ref 7–25)
CALCIUM SERPL-MCNC: 8.9 MG/DL (ref 8.6–10.5)
CHLORIDE SERPL-SCNC: 104 MMOL/L (ref 98–107)
CHOLEST SERPL-MCNC: 261 MG/DL (ref 0–200)
CO2 SERPL-SCNC: 27.2 MMOL/L (ref 22–29)
CREAT SERPL-MCNC: 0.69 MG/DL (ref 0.57–1)
EOSINOPHIL # BLD AUTO: 0.52 10*3/MM3 (ref 0–0.4)
EOSINOPHIL NFR BLD AUTO: 8.9 % (ref 0.3–6.2)
ERYTHROCYTE [DISTWIDTH] IN BLOOD BY AUTOMATED COUNT: 13.2 % (ref 12.3–15.4)
GLUCOSE SERPL-MCNC: 89 MG/DL (ref 65–99)
HCT VFR BLD AUTO: 41.3 % (ref 34–46.6)
HDLC SERPL-MCNC: 83 MG/DL (ref 40–60)
HGB BLD-MCNC: 13.6 G/DL (ref 12–15.9)
IMM GRANULOCYTES # BLD AUTO: 0.01 10*3/MM3 (ref 0–0.05)
IMM GRANULOCYTES NFR BLD AUTO: 0.2 % (ref 0–0.5)
LDLC SERPL CALC-MCNC: 170 MG/DL (ref 0–100)
LYMPHOCYTES # BLD AUTO: 1.67 10*3/MM3 (ref 0.7–3.1)
LYMPHOCYTES NFR BLD AUTO: 28.6 % (ref 19.6–45.3)
MCH RBC QN AUTO: 28.4 PG (ref 26.6–33)
MCHC RBC AUTO-ENTMCNC: 32.9 G/DL (ref 31.5–35.7)
MCV RBC AUTO: 86.2 FL (ref 79–97)
MONOCYTES # BLD AUTO: 0.45 10*3/MM3 (ref 0.1–0.9)
MONOCYTES NFR BLD AUTO: 7.7 % (ref 5–12)
NEUTROPHILS # BLD AUTO: 3.1 10*3/MM3 (ref 1.7–7)
NEUTROPHILS NFR BLD AUTO: 53.2 % (ref 42.7–76)
NRBC BLD AUTO-RTO: 0 /100 WBC (ref 0–0.2)
PLATELET # BLD AUTO: 235 10*3/MM3 (ref 140–450)
POTASSIUM SERPL-SCNC: 3.6 MMOL/L (ref 3.5–5.2)
RBC # BLD AUTO: 4.79 10*6/MM3 (ref 3.77–5.28)
SODIUM SERPL-SCNC: 143 MMOL/L (ref 136–145)
TRIGL SERPL-MCNC: 53 MG/DL (ref 0–150)
VLDLC SERPL CALC-MCNC: 8 MG/DL (ref 5–40)
WBC # BLD AUTO: 5.83 10*3/MM3 (ref 3.4–10.8)

## 2021-06-23 ENCOUNTER — OFFICE VISIT (OUTPATIENT)
Dept: FAMILY MEDICINE CLINIC | Facility: CLINIC | Age: 71
End: 2021-06-23

## 2021-06-23 VITALS
TEMPERATURE: 97.1 F | HEART RATE: 68 BPM | DIASTOLIC BLOOD PRESSURE: 72 MMHG | SYSTOLIC BLOOD PRESSURE: 122 MMHG | WEIGHT: 135.8 LBS | BODY MASS INDEX: 24.06 KG/M2 | HEIGHT: 63 IN | OXYGEN SATURATION: 98 %

## 2021-06-23 DIAGNOSIS — Z00.00 MEDICARE ANNUAL WELLNESS VISIT, SUBSEQUENT: Primary | ICD-10-CM

## 2021-06-23 PROCEDURE — G0439 PPPS, SUBSEQ VISIT: HCPCS | Performed by: FAMILY MEDICINE

## 2021-06-23 PROCEDURE — 1126F AMNT PAIN NOTED NONE PRSNT: CPT | Performed by: FAMILY MEDICINE

## 2021-06-23 PROCEDURE — 1159F MED LIST DOCD IN RCRD: CPT | Performed by: FAMILY MEDICINE

## 2021-06-23 PROCEDURE — 1170F FXNL STATUS ASSESSED: CPT | Performed by: FAMILY MEDICINE

## 2021-06-23 RX ORDER — CHLORAL HYDRATE 500 MG
1 CAPSULE ORAL
COMMUNITY
End: 2022-12-08

## 2021-06-23 NOTE — PROGRESS NOTES
The ABCs of the Annual Wellness Visit  Subsequent Medicare Wellness Visit    Chief Complaint   Patient presents with   • Medicare Wellness-subsequent       Subjective   History of Present Illness:  Zina Urban is a 70 y.o. female who presents for a Subsequent Medicare Wellness Visit.    Doing well.  Recently treated for glaucoma.  No complications.  Blood pressure normal  Eating predominantly plant-based diet with some fish  Active and tries to stay healthy  Much less stress in her life at this time  No acute complaints she declines mammogram other health maintenance items at this time    HEALTH RISK ASSESSMENT    Recent Hospitalizations:  No hospitalization(s) within the last year.    Current Medical Providers:  Patient Care Team:  Fidelina Richey MD as PCP - General (Family Medicine)    Smoking Status:  Social History     Tobacco Use   Smoking Status Never Smoker   Smokeless Tobacco Never Used       Alcohol Consumption:  Social History     Substance and Sexual Activity   Alcohol Use Yes   • Alcohol/week: 3.0 standard drinks   • Types: 3 Glasses of wine per week       Depression Screen:   PHQ-2/PHQ-9 Depression Screening 6/23/2021   Little interest or pleasure in doing things 0   Feeling down, depressed, or hopeless 0   Trouble falling or staying asleep, or sleeping too much -   Feeling tired or having little energy -   Poor appetite or overeating -   Feeling bad about yourself - or that you are a failure or have let yourself or your family down -   Trouble concentrating on things, such as reading the newspaper or watching television -   Moving or speaking so slowly that other people could have noticed. Or the opposite - being so fidgety or restless that you have been moving around a lot more than usual -   Thoughts that you would be better off dead, or of hurting yourself in some way -   Total Score 0   If you checked off any problems, how difficult have these problems made it for you to do your work,  take care of things at home, or get along with other people? -       Fall Risk Screen:  DESIRE Fall Risk Assessment was completed, and patient is at LOW risk for falls.Assessment completed on:6/23/2021    Health Habits and Functional and Cognitive Screening:  Functional & Cognitive Status 6/23/2021   Do you have difficulty preparing food and eating? No   Do you have difficulty bathing yourself, getting dressed or grooming yourself? No   Do you have difficulty using the toilet? No   Do you have difficulty moving around from place to place? No   Do you have trouble with steps or getting out of a bed or a chair? No   Current Diet -   Dental Exam -   Eye Exam -   Exercise (times per week) -   Current Exercise Activities Include -   Do you need help using the phone?  No   Are you deaf or do you have serious difficulty hearing?  No   Do you need help with transportation? No   Do you need help shopping? No   Do you need help preparing meals?  No   Do you need help with housework?  No   Do you need help with laundry? No   Do you need help taking your medications? No   Do you need help managing money? No   Do you ever drive or ride in a car without wearing a seat belt? No   Have you felt unusual stress, anger or loneliness in the last month? -   Who do you live with? -   If you need help, do you have trouble finding someone available to you? -   Do you have difficulty concentrating, remembering or making decisions? -         Does the patient have evidence of cognitive impairment? No    Asprin use counseling:Does not need ASA (and currently is not on it)    Age-appropriate Screening Schedule:  Refer to the list below for future screening recommendations based on patient's age, sex and/or medical conditions. Orders for these recommended tests are listed in the plan section. The patient has been provided with a written plan.    Health Maintenance   Topic Date Due   • DXA SCAN  Never done   • MAMMOGRAM  06/23/2022 (Originally  1950)   • LIPID PANEL  06/11/2022   • TDAP/TD VACCINES (2 - Td or Tdap) 04/17/2027   • INFLUENZA VACCINE  Discontinued   • ZOSTER VACCINE  Discontinued          The following portions of the patient's history were reviewed and updated as appropriate: problem list.    Outpatient Medications Prior to Visit   Medication Sig Dispense Refill   • ascorbic acid (VITAMIN C) 1000 MG tablet Take 1,000 mg by mouth Daily.     • Ashwagandha (Ashwagandha-Sensoril) 125 MG capsule Take 1 capsule by mouth.     • aspirin 81 MG EC tablet Take 81 mg by mouth Daily. Taking once a week     • Magnesium Ascorbate powder      • Omega-3 1000 MG capsule Take 1 capsule by mouth.     • Probiotic Product (PROBIOTIC PO) Take  by mouth.     • TURMERIC PO Take  by mouth.     • Zinc Sulfate (ZINC 15 PO) Take  by mouth.     • fluticasone (FLONASE) 50 MCG/ACT nasal spray 2 sprays into the nostril(s) as directed by provider Daily. 1 bottle 3   • polyethylene glycol (MiraLax) 17 GM/SCOOP powder Take 17 g by mouth 2 (Two) Times a Day. 289 g 1   • VITAMIN D PO Take  by mouth.       No facility-administered medications prior to visit.       Patient Active Problem List   Diagnosis   • Atrophic vaginitis   • Lower urinary tract infectious disease       Advanced Care Planning:  ACP discussion was held with the patient during this visit. Patient does not have an advance directive, declines further assistance.    Review of Systems   Constitutional: Negative for chills and fever.   HENT: Negative for congestion.    Respiratory: Negative for shortness of breath and wheezing.    Cardiovascular: Negative for chest pain.   Gastrointestinal: Negative for abdominal pain.   Genitourinary: Negative for urgency.   Musculoskeletal: Negative for back pain.   Skin: Negative for rash.   Neurological: Negative for weakness.   Psychiatric/Behavioral: Negative for confusion, decreased concentration and dysphoric mood.       Compared to one year ago, the patient feels her  "physical health is the same.  Compared to one year ago, the patient feels her mental health is the same.    Reviewed chart for potential of high risk medication in the elderly: yes  Reviewed chart for potential of harmful drug interactions in the elderly:yes    Objective         Vitals:    06/23/21 1318   BP: 122/72   Pulse: 68   Temp: 97.1 °F (36.2 °C)   SpO2: 98%   Weight: 61.6 kg (135 lb 12.8 oz)   Height: 160 cm (62.99\")       Body mass index is 24.06 kg/m².  Discussed the patient's BMI with her. The BMI is in the acceptable range.    Physical Exam  Vitals and nursing note reviewed.   Constitutional:       Appearance: She is well-developed.   HENT:      Head: Normocephalic and atraumatic.   Eyes:      Conjunctiva/sclera: Conjunctivae normal.      Pupils: Pupils are equal, round, and reactive to light.   Cardiovascular:      Rate and Rhythm: Normal rate and regular rhythm.      Heart sounds: Normal heart sounds. No murmur heard.     Pulmonary:      Effort: Pulmonary effort is normal. No respiratory distress.      Breath sounds: Normal breath sounds.   Abdominal:      General: Bowel sounds are normal.      Palpations: Abdomen is soft.   Musculoskeletal:         General: Normal range of motion.      Cervical back: Neck supple.   Skin:     General: Skin is warm and dry.   Neurological:      Mental Status: She is alert and oriented to person, place, and time.         Lab Results   Component Value Date    GLU 89 06/11/2021    CHLPL 261 (H) 06/11/2021    TRIG 53 06/11/2021    HDL 83 (H) 06/11/2021     (H) 06/11/2021    VLDL 8 06/11/2021        Assessment/Plan   Medicare Risks and Personalized Health Plan  CMS Preventative Services Quick Reference  Breast Cancer/Mammogram Screening  Cardiovascular risk  Colon Cancer Screening  Dementia/Memory   Depression/Dysphoria  Fall Risk  Glaucoma Risk  Hearing Problem  Osteoporosis Risk    The above risks/problems have been discussed with the patient.  Pertinent information " has been shared with the patient in the After Visit Summary.  Follow up plans and orders are seen below in the Assessment/Plan Section.    Diagnoses and all orders for this visit:    1. Medicare annual wellness visit, subsequent (Primary)      Follow Up: 1yr      An After Visit Summary and PPPS were given to the patient.

## 2022-06-16 DIAGNOSIS — Z00.00 ANNUAL PHYSICAL EXAM: Primary | ICD-10-CM

## 2022-06-23 LAB
ALBUMIN SERPL-MCNC: 4.4 G/DL (ref 3.7–4.7)
ALBUMIN/GLOB SERPL: 1.9 {RATIO} (ref 1.2–2.2)
ALP SERPL-CCNC: 76 IU/L (ref 44–121)
ALT SERPL-CCNC: 14 IU/L (ref 0–32)
AST SERPL-CCNC: 19 IU/L (ref 0–40)
BASOPHILS # BLD AUTO: 0.1 X10E3/UL (ref 0–0.2)
BASOPHILS NFR BLD AUTO: 1 %
BILIRUB SERPL-MCNC: 0.5 MG/DL (ref 0–1.2)
BUN SERPL-MCNC: 10 MG/DL (ref 8–27)
BUN/CREAT SERPL: 14 (ref 12–28)
CALCIUM SERPL-MCNC: 9.6 MG/DL (ref 8.7–10.3)
CHLORIDE SERPL-SCNC: 105 MMOL/L (ref 96–106)
CHOLEST SERPL-MCNC: 301 MG/DL (ref 100–199)
CO2 SERPL-SCNC: 26 MMOL/L (ref 20–29)
CREAT SERPL-MCNC: 0.69 MG/DL (ref 0.57–1)
EGFRCR SERPLBLD CKD-EPI 2021: 92 ML/MIN/1.73
EOSINOPHIL # BLD AUTO: 0.2 X10E3/UL (ref 0–0.4)
EOSINOPHIL NFR BLD AUTO: 4 %
ERYTHROCYTE [DISTWIDTH] IN BLOOD BY AUTOMATED COUNT: 12.9 % (ref 11.7–15.4)
GLOBULIN SER CALC-MCNC: 2.3 G/DL (ref 1.5–4.5)
GLUCOSE SERPL-MCNC: 86 MG/DL (ref 65–99)
HCT VFR BLD AUTO: 39.5 % (ref 34–46.6)
HDLC SERPL-MCNC: 90 MG/DL
HGB BLD-MCNC: 13 G/DL (ref 11.1–15.9)
IMM GRANULOCYTES # BLD AUTO: 0 X10E3/UL (ref 0–0.1)
IMM GRANULOCYTES NFR BLD AUTO: 0 %
LDLC SERPL CALC-MCNC: 201 MG/DL (ref 0–99)
LYMPHOCYTES # BLD AUTO: 1.4 X10E3/UL (ref 0.7–3.1)
LYMPHOCYTES NFR BLD AUTO: 30 %
MCH RBC QN AUTO: 27.7 PG (ref 26.6–33)
MCHC RBC AUTO-ENTMCNC: 32.9 G/DL (ref 31.5–35.7)
MCV RBC AUTO: 84 FL (ref 79–97)
MONOCYTES # BLD AUTO: 0.5 X10E3/UL (ref 0.1–0.9)
MONOCYTES NFR BLD AUTO: 11 %
NEUTROPHILS # BLD AUTO: 2.4 X10E3/UL (ref 1.4–7)
NEUTROPHILS NFR BLD AUTO: 54 %
PLATELET # BLD AUTO: 221 X10E3/UL (ref 150–450)
POTASSIUM SERPL-SCNC: 3.8 MMOL/L (ref 3.5–5.2)
PROT SERPL-MCNC: 6.7 G/DL (ref 6–8.5)
RBC # BLD AUTO: 4.69 X10E6/UL (ref 3.77–5.28)
SODIUM SERPL-SCNC: 144 MMOL/L (ref 134–144)
TRIGL SERPL-MCNC: 68 MG/DL (ref 0–149)
VLDLC SERPL CALC-MCNC: 10 MG/DL (ref 5–40)
WBC # BLD AUTO: 4.5 X10E3/UL (ref 3.4–10.8)

## 2022-08-05 ENCOUNTER — OFFICE VISIT (OUTPATIENT)
Dept: FAMILY MEDICINE CLINIC | Facility: CLINIC | Age: 72
End: 2022-08-05

## 2022-08-05 VITALS
OXYGEN SATURATION: 97 % | TEMPERATURE: 97.1 F | HEART RATE: 70 BPM | HEIGHT: 63 IN | WEIGHT: 141 LBS | DIASTOLIC BLOOD PRESSURE: 70 MMHG | BODY MASS INDEX: 24.98 KG/M2 | SYSTOLIC BLOOD PRESSURE: 118 MMHG

## 2022-08-05 DIAGNOSIS — Z00.00 MEDICARE ANNUAL WELLNESS VISIT, SUBSEQUENT: Primary | ICD-10-CM

## 2022-08-05 PROCEDURE — G0439 PPPS, SUBSEQ VISIT: HCPCS | Performed by: FAMILY MEDICINE

## 2022-08-05 PROCEDURE — 1160F RVW MEDS BY RX/DR IN RCRD: CPT | Performed by: FAMILY MEDICINE

## 2022-08-05 PROCEDURE — 1170F FXNL STATUS ASSESSED: CPT | Performed by: FAMILY MEDICINE

## 2022-08-05 NOTE — PROGRESS NOTES
The ABCs of the Annual Wellness Visit  Subsequent Medicare Wellness Visit    Chief Complaint   Patient presents with   • Medicare Wellness-subsequent      Subjective    History of Present Illness:  Zina Urban is a 71 y.o. female who presents for a Subsequent Medicare Wellness Visit.    The following portions of the patient's history were reviewed and   updated as appropriate: past social history, past surgical history and problem list.    Compared to one year ago, the patient feels her physical   health is better.    Compared to one year ago, the patient feels her mental   health is better.    Recent Hospitalizations:  She was not admitted to the hospital during the last year.       Current Medical Providers:  Patient Care Team:  Fidelina Richey MD as PCP - General (Family Medicine)    Outpatient Medications Prior to Visit   Medication Sig Dispense Refill   • Magnesium Ascorbate powder Multi mineral liquid     • NON FORMULARY Beet capsule     • ascorbic acid (VITAMIN C) 1000 MG tablet Take 1,000 mg by mouth Daily.     • Ashwagandha (Ashwagandha-Sensoril) 125 MG capsule Take 1 capsule by mouth.     • aspirin 81 MG EC tablet Take 81 mg by mouth Daily. Taking once a week     • Omega-3 1000 MG capsule Take 1 capsule by mouth.     • Probiotic Product (PROBIOTIC PO) Take  by mouth.     • TURMERIC PO Take  by mouth.     • Zinc Sulfate (ZINC 15 PO) Take  by mouth.       No facility-administered medications prior to visit.       No opioid medication identified on active medication list. I have reviewed chart for other potential  high risk medication/s and harmful drug interactions in the elderly.          Aspirin is on active medication list. Aspirin use is indicated based on review of current medical condition/s. Pros and cons of this therapy have been discussed today. Benefits of this medication outweigh potential harm.  Patient has been encouraged to continue taking this medication.  .      Patient Active  "Problem List   Diagnosis   • Atrophic vaginitis   • Lower urinary tract infectious disease     Advance Care Planning  Advance Directive is not on file.      Review of Systems   Constitutional: Negative for fever.   HENT: Negative for congestion.    Respiratory: Negative for shortness of breath.    Cardiovascular: Negative for chest pain.   Neurological: Negative for weakness and confusion.   Psychiatric/Behavioral: Negative for dysphoric mood.        Objective    Vitals:    08/05/22 1419   BP: 118/70   BP Location: Left arm   Patient Position: Sitting   Cuff Size: Adult   Pulse: 70   Temp: 97.1 °F (36.2 °C)   SpO2: 97%   Weight: 64 kg (141 lb)   Height: 160 cm (62.99\")     Estimated body mass index is 24.98 kg/m² as calculated from the following:    Height as of this encounter: 160 cm (62.99\").    Weight as of this encounter: 64 kg (141 lb).    BMI is within normal parameters. No other follow-up for BMI required.      Does the patient have evidence of cognitive impairment? No    Physical Exam  Vitals reviewed.   Eyes:      Pupils: Pupils are equal, round, and reactive to light.   Cardiovascular:      Rate and Rhythm: Normal rate and regular rhythm.      Pulses: Normal pulses.   Pulmonary:      Effort: Pulmonary effort is normal.   Abdominal:      General: Abdomen is flat.   Musculoskeletal:         General: Normal range of motion.   Skin:     General: Skin is warm.      Capillary Refill: Capillary refill takes less than 2 seconds.   Neurological:      General: No focal deficit present.      Mental Status: She is alert.       Lab Results   Component Value Date    CHLPL 301 (H) 06/22/2022    TRIG 68 06/22/2022    HDL 90 06/22/2022     (H) 06/22/2022    VLDL 10 06/22/2022            HEALTH RISK ASSESSMENT    Smoking Status:  Social History     Tobacco Use   Smoking Status Never Smoker   Smokeless Tobacco Never Used     Alcohol Consumption:  Social History     Substance and Sexual Activity   Alcohol Use Yes   • " Alcohol/week: 3.0 standard drinks   • Types: 3 Glasses of wine per week     Fall Risk Screen:    DESIRE Fall Risk Assessment was completed, and patient is at LOW risk for falls.Assessment completed on:8/5/2022    Depression Screening:  PHQ-2/PHQ-9 Depression Screening 8/5/2022   Retired PHQ-9 Total Score -   Retired Total Score -   Little Interest or Pleasure in Doing Things 0-->not at all   Feeling Down, Depressed or Hopeless 0-->not at all   PHQ-9: Brief Depression Severity Measure Score 0       Health Habits and Functional and Cognitive Screening:  Functional & Cognitive Status 8/5/2022   Do you have difficulty preparing food and eating? No   Do you have difficulty bathing yourself, getting dressed or grooming yourself? No   Do you have difficulty using the toilet? No   Do you have difficulty moving around from place to place? No   Do you have trouble with steps or getting out of a bed or a chair? No   Current Diet Well Balanced Diet   Dental Exam Up to date   Eye Exam Up to date   Exercise (times per week) 7 times per week   Current Exercises Include Walking;Other   Current Exercise Activities Include -   Do you need help using the phone?  No   Are you deaf or do you have serious difficulty hearing?  No   Do you need help with transportation? No   Do you need help shopping? No   Do you need help preparing meals?  No   Do you need help with housework?  No   Do you need help with laundry? No   Do you need help taking your medications? No   Do you need help managing money? No   Do you ever drive or ride in a car without wearing a seat belt? No   Have you felt unusual stress, anger or loneliness in the last month? No   Who do you live with? Alone   If you need help, do you have trouble finding someone available to you? No   Have you been bothered in the last four weeks by sexual problems? No   Do you have difficulty concentrating, remembering or making decisions? No       Age-appropriate Screening Schedule:  Refer  to the list below for future screening recommendations based on patient's age, sex and/or medical conditions. Orders for these recommended tests are listed in the plan section. The patient has been provided with a written plan.    Health Maintenance   Topic Date Due   • DXA SCAN  Never done   • MAMMOGRAM  07/27/2017   • LIPID PANEL  06/22/2023   • TDAP/TD VACCINES (2 - Td or Tdap) 04/17/2027   • INFLUENZA VACCINE  Discontinued   • ZOSTER VACCINE  Discontinued              Assessment & Plan   CMS Preventative Services Quick Reference  Risk Factors Identified During Encounter  Cardiovascular Disease  Dementia/Memory   Depression/Dysphoria  Fall Risk-High or Moderate  Inadequate Social Support, Isolation, Loneliness, Lack of Transportation, Financial Difficulties, or Caregiver Stress   Inactivity/Sedentary  The above risks/problems have been discussed with the patient.  Follow up actions/plans if indicated are seen below in the Assessment/Plan Section.  Pertinent information has been shared with the patient in the After Visit Summary.    Diagnoses and all orders for this visit:    1. Medicare annual wellness visit, subsequent (Primary)      Chronically elevated cholesterol, declines statin  Doing very well eating well, exercising, mood is doing well  Normotensive, no anemia, normal renal function    Follow Up:   1yr    An After Visit Summary and PPPS were made available to the patient.

## 2022-12-08 ENCOUNTER — OFFICE VISIT (OUTPATIENT)
Dept: FAMILY MEDICINE CLINIC | Facility: CLINIC | Age: 72
End: 2022-12-08

## 2022-12-08 VITALS
HEART RATE: 92 BPM | SYSTOLIC BLOOD PRESSURE: 130 MMHG | BODY MASS INDEX: 24.59 KG/M2 | WEIGHT: 144 LBS | DIASTOLIC BLOOD PRESSURE: 80 MMHG | HEIGHT: 64 IN | OXYGEN SATURATION: 100 % | TEMPERATURE: 98 F | RESPIRATION RATE: 18 BRPM

## 2022-12-08 DIAGNOSIS — E78.2 MIXED HYPERLIPIDEMIA: Primary | ICD-10-CM

## 2022-12-08 PROBLEM — I73.00 RAYNAUD'S DISEASE WITHOUT GANGRENE: Status: ACTIVE | Noted: 2022-12-08

## 2022-12-08 PROCEDURE — 99213 OFFICE O/P EST LOW 20 MIN: CPT | Performed by: FAMILY MEDICINE

## 2022-12-08 RX ORDER — SOY ISOFLAVONE 40 MG
TABLET ORAL
COMMUNITY

## 2022-12-08 NOTE — PROGRESS NOTES
"Chief Complaint  Chief Complaint   Patient presents with   • Establish Care     New Pt          Subjective    History of Present Illness        Zina Urban presents to [facility] for   History of Present Illness  CC  Hyperlipidemia    HPI    Health maintenance  She reports if she is ill or stressed her blood pressure will spike. The patient adds she sis have strep a couple of weeks ago and it spiked then. She has previously had a broken arm, and misplaced rib cage on her 48th birthday. The patient did lose the use of her right arm and hand for about 5 years which she was on disability for. She refused surgery for the injury and went the alternative route with going to a chiropractor, acupressure, acupuncture and therapeutic services. The patient had the natural alternative for 6 months three days a week. She adds she had Raynaud's as a young girl and occasionally still gets it but not like she did as a teenager. The patient was informed she had scoliosis when she had her first child. She states she takes zinc but tries to take it in juice an dairy, occasionally she will order the liquid zinc. The patient takes beet capsules because she had some anemia. She would like to lose weight she currently weighs 144 pounds.    Hyperlipidemia  The patient cholesterol is elevated. She does not want to take any medication for the problem. The patient was under tremendous stress with her marriage and feels that plays a part for the elevation. She adds it is hereditary on both sides of her family.          Objective   Vital Signs:   Visit Vitals  /80   Pulse 92   Temp 98 °F (36.7 °C)   Resp 18   Ht 162.6 cm (64\")   Wt 65.3 kg (144 lb)   SpO2 100%   BMI 24.72 kg/m²       BMI is within normal parameters. No other follow-up for BMI required.     Physical Exam  Vitals reviewed.   Constitutional:       Appearance: She is well-developed.   HENT:      Head: Normocephalic.      Right Ear: " External ear normal.      Left Ear: External ear normal.      Nose: Nose normal.   Eyes:      Conjunctiva/sclera: Conjunctivae normal.   Cardiovascular:      Rate and Rhythm: Normal rate and regular rhythm.   Pulmonary:      Effort: Pulmonary effort is normal.      Breath sounds: Normal breath sounds.   Musculoskeletal:         General: Normal range of motion.      Cervical back: Normal range of motion and neck supple.   Skin:     General: Skin is warm and dry.      Capillary Refill: Capillary refill takes less than 2 seconds.   Neurological:      Mental Status: She is alert and oriented to person, place, and time.              Result Review :                      Assessment and Plan      Diagnoses and all orders for this visit:    1. Mixed hyperlipidemia (Primary)  Assessment & Plan:  Lipid abnormalities are unchanged.  Nutritional counseling was provided.  Lipids will be reassessed in 6 months.             Follow Up   No follow-ups on file.  Patient was given instructions and counseling regarding her condition or for health maintenance advice. Please see specific information pulled into the AVS if appropriate.     Transcribed from ambient dictation for Goran Cuevas Sr, MD by Nazario Taylor.  12/08/22   17:32 EST    Patient or patient representative verbalized consent to the visit recording.  I have personally performed the services described in this document as transcribed by the above individual, and it is both accurate and complete.

## 2022-12-18 PROBLEM — E78.2 MIXED HYPERLIPIDEMIA: Status: ACTIVE | Noted: 2022-12-18

## 2023-05-18 ENCOUNTER — TELEPHONE (OUTPATIENT)
Dept: FAMILY MEDICINE CLINIC | Facility: CLINIC | Age: 73
End: 2023-05-18
Payer: MEDICARE

## 2023-05-18 NOTE — TELEPHONE ENCOUNTER
Called pt and left VM to inform of cancelling appt on 5/19/23 due to Dr. Cuevas will be out of the office.

## 2023-08-31 ENCOUNTER — OFFICE VISIT (OUTPATIENT)
Dept: FAMILY MEDICINE CLINIC | Facility: CLINIC | Age: 73
End: 2023-08-31
Payer: MEDICARE

## 2023-08-31 VITALS
WEIGHT: 125 LBS | SYSTOLIC BLOOD PRESSURE: 126 MMHG | BODY MASS INDEX: 21.46 KG/M2 | DIASTOLIC BLOOD PRESSURE: 62 MMHG | OXYGEN SATURATION: 97 % | HEART RATE: 75 BPM

## 2023-08-31 DIAGNOSIS — E78.2 MIXED HYPERLIPIDEMIA: ICD-10-CM

## 2023-08-31 DIAGNOSIS — Z00.00 MEDICARE ANNUAL WELLNESS VISIT, SUBSEQUENT: Primary | ICD-10-CM

## 2023-08-31 NOTE — PROGRESS NOTES
QUICK REFERENCE INFORMATION:  The ABCs of the Annual Wellness Visit    Subsequent Medicare Wellness Visit     HEALTH RISK ASSESSMENT    : 1950    Recent Hospitalizations:  Recently treated at the following:  Other: [facility] .  [facility]    Current Medical Providers:  Patient Care Team:  Goran Cuevas Sr., MD as PCP - General (Family Medicine)    Smoking Status:  Social History     Tobacco Use   Smoking Status Never   Smokeless Tobacco Never       Alcohol Consumption:  Social History     Substance and Sexual Activity   Alcohol Use Not Currently       Depression Screen:       2023     8:58 AM   PHQ-2/PHQ-9 Depression Screening   Little Interest or Pleasure in Doing Things 0-->not at all   Feeling Down, Depressed or Hopeless 0-->not at all   PHQ-9: Brief Depression Severity Measure Score 0       Health Habits and Functional and Cognitive Screenin/31/2023     8:00 AM   Functional & Cognitive Status   Do you have difficulty preparing food and eating? No   Do you have difficulty bathing yourself, getting dressed or grooming yourself? No   Do you have difficulty using the toilet? No   Do you have difficulty moving around from place to place? No   Do you have trouble with steps or getting out of a bed or a chair? No   Current Diet Well Balanced Diet   Dental Exam Not up to date   Eye Exam Up to date   Exercise (times per week) 5 times per week   Current Exercises Include Walking   Do you need help using the phone?  No   Are you deaf or do you have serious difficulty hearing?  No   Do you need help to go to places out of walking distance? No   Do you need help shopping? No   Do you need help preparing meals?  No   Do you need help with housework?  No   Do you need help with laundry? No   Do you need help taking your medications? No   Do you need help managing money? No   Do you ever drive or ride in a car without wearing a seat belt? No   Have you felt unusual stress, anger or loneliness in the last  "month? No   Who do you live with? Alone   If you need help, do you have trouble finding someone available to you? No   Do you have difficulty concentrating, remembering or making decisions? No       Does the patient have evidence of cognitive impairment? No    Mini-Mental State Examination (MMSE)        Instructions: Ask the questions in the order listed. Score one point for each correct response within each question or activity.      Maximum Score  Patient's Score  Questions    5  5  "What is the year?  Season?  Date?  Day of the week?  Month?"    5  5  "Where are we now: State?  County?  Town/city?  Hospital?  Floor?"    3   3 The examiner names three unrelated objects clearly and slowly, then asks the patient to name all three of them. The patient's response is used for scoring. The examiner repeats them until patient learns all of them, if possible. Number of trials: ___________    5  5  "I would like you to count backward from 100 by sevens." (93, 86, 79, 72, 65, .) Stop after five answers.   Alternative: "Spell WORLD backwards." ([id number])    3   3 "Earlier I told you the names of three things. Can you tell me what those were?"    2  2  Show the patient two simple objects, such as a wristwatch and a pencil, and ask the patient to name them.    1   1 "Repeat the phrase: `No ifs, ands, or buts.'"    3  3   "Take the paper in your right hand, fold it in half, and put it on the floor."   (The examiner gives the patient a piece of blank paper.)    1   1 "Please read this and do what it says." (Written instruction is "Close your eyes.")    1   1 "Make up and write a sentence about anything." (This sentence must contain a noun and a verb.)    1  1  "Please copy this picture." (The examiner gives the patient a blank piece of paper and asks him/her to draw the symbol below. All 10 angles must be present and two must intersect.)             30  30  TOTAL          Aspirin use counseling:  No    Recent Lab Results:     "      Lab Results   Component Value Date    TRIG 68 06/22/2022    HDL 90 06/22/2022    VLDL 10 06/22/2022       Age-appropriate Screening Schedule:  Refer to the list below for future screening recommendations based on patient's age, sex and/or medical conditions. Orders for these recommended tests are listed in the plan section. The patient has been provided with a written plan.    Health Maintenance   Topic Date Due    DXA SCAN  Never done    Pneumococcal Vaccine 65+ (1 - PCV) Never done    MAMMOGRAM  07/27/2017    HEPATITIS C SCREENING  Never done    COVID-19 Vaccine (4 - Moderna series) 02/21/2022    LIPID PANEL  06/22/2023    ANNUAL WELLNESS VISIT  08/31/2024    TDAP/TD VACCINES (2 - Td or Tdap) 04/17/2027    COLORECTAL CANCER SCREENING  05/03/2028    INFLUENZA VACCINE  Discontinued    ZOSTER VACCINE  Discontinued        Subjective   History of Present Illness:  Zina Urban is a 72 y.o. female who presents for an Annual Wellness Visit.  Compared to one year ago, the patient feels her physical health is better.  Compared to one year ago, the patient feels her mental health is the same.  History of Present Illness     Allergies:  Allergies   Allergen Reactions    Codeine Unknown - Low Severity     drowsiness       Social History:  Social History     Socioeconomic History    Marital status: Single   Tobacco Use    Smoking status: Never    Smokeless tobacco: Never   Vaping Use    Vaping Use: Never used   Substance and Sexual Activity    Alcohol use: Not Currently    Drug use: No    Sexual activity: Not Currently     Partners: Male     Birth control/protection: Post-menopausal       Family History:  Family History   Problem Relation Age of Onset    Heart failure Father     Glaucoma Father     Kidney disease Father     Heart disease Father     Stroke Father     Vision loss Father     Cancer Paternal Grandmother         Bone, lungs    Hypertension Mother     Depression Mother     Stroke Mother        Past  Medical History :  Active Ambulatory Problems     Diagnosis Date Noted    Atrophic vaginitis 10/30/2015    Lower urinary tract infectious disease 06/08/2015    Raynaud's disease without gangrene 12/08/2022    Mixed hyperlipidemia 12/18/2022    Medicare annual wellness visit, subsequent 08/31/2023     Resolved Ambulatory Problems     Diagnosis Date Noted    No Resolved Ambulatory Problems     Past Medical History:   Diagnosis Date    Allergic     Anemia     Cataract 2021    Glaucoma     Headache     Hyperlipidemia     Hypertension     Scoliosis     Visual impairment        Medication List:  Outpatient Encounter Medications as of 8/31/2023   Medication Sig Dispense Refill    Magnesium Ascorbate powder Multi mineral liquid      Methylsulfonylmethane (MSM) 1000 MG capsule       [DISCONTINUED] Probiotic Product (PROBIOTIC PO) Take  by mouth.       No facility-administered encounter medications on file as of 8/31/2023.     Reviewed use of high risk medication in the elderly: yes  Reviewed for potential of harmful drug interactions in the elderly: yes    Past Surgical History:  Past Surgical History:   Procedure Laterality Date    CLOSED REDUCTION AND PERCUTANEOUS PINNING OF HUMERUS FRACTURE      05/2023    EYE SURGERY      OOPHORECTOMY          The following portions of the patient's history were reviewed and updated as appropriate: allergies, current medications, past family history, past medical history, past social history, past surgical history and problem list.    Review Of Systems:  Review of Systems   Constitutional:  Negative for activity change, appetite change and fatigue.   HENT:  Negative for congestion, postnasal drip, sinus pressure and sore throat.    Eyes:  Negative for blurred vision and itching.   Respiratory:  Negative for cough, shortness of breath and wheezing.    Cardiovascular:  Negative for chest pain.   Gastrointestinal:  Negative for abdominal pain, constipation, nausea, vomiting and GERD.    Endocrine: Negative for cold intolerance and heat intolerance.   Genitourinary:  Negative for difficulty urinating, dysuria and urinary incontinence.   Musculoskeletal:  Negative for back pain, joint swelling and neck pain.   Skin:  Negative for color change and rash.   Neurological:  Negative for dizziness, speech difficulty, weakness and memory problem.   Psychiatric/Behavioral:  Negative for behavioral problems, decreased concentration, suicidal ideas and depressed mood.    I have reviewed and confirmed the accuracy of the ROS as documented by the MA/LPN/RN Goran Cuevas Sr, MD    Objective     Physical Exam:  Vital Signs:  Visit Vitals  /62 (BP Location: Left arm, Patient Position: Sitting, Cuff Size: Adult)   Pulse 75   Wt 56.7 kg (125 lb)   SpO2 97%   BMI 21.46 kg/mý     BMI is within normal parameters. No other follow-up for BMI required.      Physical Exam  Vitals reviewed.   Constitutional:       Appearance: She is well-developed.   HENT:      Head: Normocephalic and atraumatic.      Nose: Nose normal.   Eyes:      General: Lids are normal.      Conjunctiva/sclera: Conjunctivae normal.      Pupils: Pupils are equal, round, and reactive to light.   Cardiovascular:      Rate and Rhythm: Normal rate and regular rhythm.      Pulses: Normal pulses.      Heart sounds: Normal heart sounds.   Pulmonary:      Effort: Pulmonary effort is normal. No respiratory distress.      Breath sounds: Normal breath sounds.   Abdominal:      General: Bowel sounds are normal.      Palpations: Abdomen is soft.   Musculoskeletal:         General: Normal range of motion.      Cervical back: Normal range of motion and neck supple.   Skin:     General: Skin is warm and dry.      Capillary Refill: Capillary refill takes less than 2 seconds.   Neurological:      Mental Status: She is alert and oriented to person, place, and time.   Psychiatric:         Behavior: Behavior normal.         Thought Content: Thought content normal.          Judgment: Judgment normal.       Assessment & Plan   Assessment and Plan:  Patient Self-Management and Personalized Health Advice  The patient has been provided with information about: designing advance directives and preventive services including:   Annual Wellness Visit (AWV).    Advance Care Planning:  ACP discussion was held with the patient during this visit. Patient does not have an advance directive, information provided.  Identification of Risk Factors:  Risk factors include: Advance Directive Discussion.    Diagnoses and all orders for this visit:    1. Medicare annual wellness visit, subsequent (Primary)  Assessment & Plan:  Medicare wellness completed.  Discussed advanced directives with patient.  Performed the Mini-Mental exam and patient scored 30/30.      2. Mixed hyperlipidemia  -     CBC & Differential  -     Comprehensive Metabolic Panel  -     TSH  -     Lipid Panel With / Chol / HDL Ratio          Follow Up:  No follow-ups on file.     An After Visit Summary and PPPS with all of these plans were given to the patient.

## 2023-09-01 LAB
ALBUMIN SERPL-MCNC: 4.8 G/DL (ref 3.5–5.2)
ALBUMIN/GLOB SERPL: 2.1 G/DL
ALP SERPL-CCNC: 83 U/L (ref 39–117)
ALT SERPL-CCNC: 13 U/L (ref 1–33)
AST SERPL-CCNC: 20 U/L (ref 1–32)
BASOPHILS # BLD AUTO: 0.05 10*3/MM3 (ref 0–0.2)
BASOPHILS NFR BLD AUTO: 1.1 % (ref 0–1.5)
BILIRUB SERPL-MCNC: 0.4 MG/DL (ref 0–1.2)
BUN SERPL-MCNC: 12 MG/DL (ref 8–23)
BUN/CREAT SERPL: 16.2 (ref 7–25)
CALCIUM SERPL-MCNC: 9.6 MG/DL (ref 8.6–10.5)
CHLORIDE SERPL-SCNC: 104 MMOL/L (ref 98–107)
CHOLEST SERPL-MCNC: 288 MG/DL (ref 0–200)
CHOLEST/HDLC SERPL: 3.06 {RATIO}
CO2 SERPL-SCNC: 23.8 MMOL/L (ref 22–29)
CREAT SERPL-MCNC: 0.74 MG/DL (ref 0.57–1)
EGFRCR SERPLBLD CKD-EPI 2021: 85 ML/MIN/1.73
EOSINOPHIL # BLD AUTO: 0.11 10*3/MM3 (ref 0–0.4)
EOSINOPHIL NFR BLD AUTO: 2.4 % (ref 0.3–6.2)
ERYTHROCYTE [DISTWIDTH] IN BLOOD BY AUTOMATED COUNT: 13.1 % (ref 12.3–15.4)
GLOBULIN SER CALC-MCNC: 2.3 GM/DL
GLUCOSE SERPL-MCNC: 83 MG/DL (ref 65–99)
HCT VFR BLD AUTO: 41.6 % (ref 34–46.6)
HDLC SERPL-MCNC: 94 MG/DL (ref 40–60)
HGB BLD-MCNC: 14.1 G/DL (ref 12–15.9)
IMM GRANULOCYTES # BLD AUTO: 0.01 10*3/MM3 (ref 0–0.05)
IMM GRANULOCYTES NFR BLD AUTO: 0.2 % (ref 0–0.5)
LDLC SERPL CALC-MCNC: 184 MG/DL (ref 0–100)
LYMPHOCYTES # BLD AUTO: 1.24 10*3/MM3 (ref 0.7–3.1)
LYMPHOCYTES NFR BLD AUTO: 27.4 % (ref 19.6–45.3)
MCH RBC QN AUTO: 27.9 PG (ref 26.6–33)
MCHC RBC AUTO-ENTMCNC: 33.9 G/DL (ref 31.5–35.7)
MCV RBC AUTO: 82.4 FL (ref 79–97)
MONOCYTES # BLD AUTO: 0.4 10*3/MM3 (ref 0.1–0.9)
MONOCYTES NFR BLD AUTO: 8.8 % (ref 5–12)
NEUTROPHILS # BLD AUTO: 2.71 10*3/MM3 (ref 1.7–7)
NEUTROPHILS NFR BLD AUTO: 60.1 % (ref 42.7–76)
NRBC BLD AUTO-RTO: 0 /100 WBC (ref 0–0.2)
PLATELET # BLD AUTO: 223 10*3/MM3 (ref 140–450)
POTASSIUM SERPL-SCNC: 3.8 MMOL/L (ref 3.5–5.2)
PROT SERPL-MCNC: 7.1 G/DL (ref 6–8.5)
RBC # BLD AUTO: 5.05 10*6/MM3 (ref 3.77–5.28)
SODIUM SERPL-SCNC: 143 MMOL/L (ref 136–145)
TRIGL SERPL-MCNC: 69 MG/DL (ref 0–150)
TSH SERPL DL<=0.005 MIU/L-ACNC: 1.73 UIU/ML (ref 0.27–4.2)
VLDLC SERPL CALC-MCNC: 10 MG/DL (ref 5–40)
WBC # BLD AUTO: 4.52 10*3/MM3 (ref 3.4–10.8)

## 2024-01-17 ENCOUNTER — OFFICE VISIT (OUTPATIENT)
Dept: FAMILY MEDICINE CLINIC | Facility: CLINIC | Age: 74
End: 2024-01-17
Payer: MEDICARE

## 2024-01-17 VITALS
TEMPERATURE: 97.5 F | RESPIRATION RATE: 17 BRPM | HEART RATE: 102 BPM | OXYGEN SATURATION: 98 % | DIASTOLIC BLOOD PRESSURE: 80 MMHG | BODY MASS INDEX: 21.34 KG/M2 | HEIGHT: 64 IN | WEIGHT: 125 LBS | SYSTOLIC BLOOD PRESSURE: 130 MMHG

## 2024-01-17 DIAGNOSIS — S42.301A CLOSED FRACTURE OF RIGHT UPPER EXTREMITY, INITIAL ENCOUNTER: ICD-10-CM

## 2024-01-17 DIAGNOSIS — M89.8X1 PAIN OF RIGHT SCAPULA: Primary | ICD-10-CM

## 2024-01-17 NOTE — PROGRESS NOTES
"Chief Complaint  Chief Complaint   Patient presents with    referral      Pt requesting PT referral ( [facility] )       Subjective    History of Present Illness        Zina Urban presents to [facility] for   History of Present Illness  Patient is a 73-year-old female that is being seen in the clinic for right-sided scapular and rib pain.  She reports that this has been going on since she had her initial fracture of her right arm.  She currently takes magnesium and msm to help as well as a lot of water.  She had something happen about 25 years ago when she again fell and broke her right arm.       Objective   Vital Signs:   Visit Vitals  /80   Pulse 102   Temp 97.5 °F (36.4 °C)   Resp 17   Ht 162.6 cm (64\")   Wt 56.7 kg (125 lb)   SpO2 98%   BMI 21.46 kg/m²       BMI is within normal parameters. No other follow-up for BMI required.     Physical Exam  Vitals reviewed.   Constitutional:       Appearance: She is well-developed.   HENT:      Head: Normocephalic.      Right Ear: External ear normal.      Left Ear: External ear normal.      Nose: Nose normal.   Eyes:      Conjunctiva/sclera: Conjunctivae normal.   Cardiovascular:      Rate and Rhythm: Normal rate and regular rhythm.   Pulmonary:      Effort: Pulmonary effort is normal.      Breath sounds: Normal breath sounds.   Musculoskeletal:         General: Normal range of motion.      Right upper arm: Tenderness and bony tenderness present.      Cervical back: Normal range of motion and neck supple.   Skin:     General: Skin is warm and dry.      Capillary Refill: Capillary refill takes less than 2 seconds.   Neurological:      Mental Status: She is alert and oriented to person, place, and time.            Result Review :                    Assessment and Plan      Diagnoses and all orders for this visit:    1. Pain of right scapula (Primary)  Assessment & Plan:  Due to her symptoms she would like to be referred to physical " therapy for further evaluation and treatment.  Patient was in an accident which resulted in her breaking her arm.  It left her with pain in that right arm in her scapula.    Orders:  -     Ambulatory Referral to Physical Therapy Evaluate and treat    2. Closed fracture of right upper extremity, initial encounter  Assessment & Plan:  While she was at work she fell, which resulted in her arm fracture.  She has been seen by Ortho and chiropractor.                 Follow Up   No follow-ups on file.  Patient was given instructions and counseling regarding her condition or for health maintenance advice. Please see specific information pulled into the AVS if appropriate.       Answers submitted by the patient for this visit:  Other (Submitted on 1/17/2024)  Please describe your symptoms.: Having pain rachel in my shoulder-shoulder blade contuing from broken arm right under scapula [facility] in May. At time i was on Workmans Comp with physical therspy for a limited amout of visits.  Wanting to get phyical therapy set up agsin through my Medicare this time.  The pain is nerve strain from my scsoula ti my shoulder blade /rib cage.  Have you had these symptoms before?: Yes  How long have you been having these symptoms?: Greater than 2 weeks  Please list any medications you are currently taking for this condition.: None.  Please describe any probable cause for these symptoms. : From fall in May.  Primary Reason for Visit (Submitted on 1/17/2024)  What is the primary reason for your visit?: Other

## 2024-01-18 PROBLEM — M89.8X1 PAIN OF RIGHT SCAPULA: Status: ACTIVE | Noted: 2024-01-18

## 2024-01-18 PROBLEM — S42.301A CLOSED FRACTURE OF RIGHT UPPER LIMB: Status: ACTIVE | Noted: 2024-01-18

## 2024-01-18 NOTE — ASSESSMENT & PLAN NOTE
While she was at work she fell, which resulted in her arm fracture.  She has been seen by Ortho and chiropractor.

## 2024-01-18 NOTE — ASSESSMENT & PLAN NOTE
Due to her symptoms she would like to be referred to physical therapy for further evaluation and treatment.  Patient was in an accident which resulted in her breaking her arm.  It left her with pain in that right arm in her scapula.

## 2024-02-01 ENCOUNTER — TREATMENT (OUTPATIENT)
Dept: PHYSICAL THERAPY | Facility: CLINIC | Age: 74
End: 2024-02-01
Payer: MEDICARE

## 2024-02-01 DIAGNOSIS — M54.12 RADICULOPATHY, CERVICAL REGION: Primary | ICD-10-CM

## 2024-02-01 DIAGNOSIS — R53.1 WEAKNESS GENERALIZED: ICD-10-CM

## 2024-02-01 DIAGNOSIS — M25.511 CHRONIC RIGHT SHOULDER PAIN: ICD-10-CM

## 2024-02-01 DIAGNOSIS — M99.01 SEGMENTAL AND SOMATIC DYSFUNCTION OF CERVICAL REGION: ICD-10-CM

## 2024-02-01 DIAGNOSIS — G89.29 CHRONIC RIGHT SHOULDER PAIN: ICD-10-CM

## 2024-02-01 PROCEDURE — 97110 THERAPEUTIC EXERCISES: CPT | Performed by: PHYSICAL THERAPIST

## 2024-02-01 PROCEDURE — 97161 PT EVAL LOW COMPLEX 20 MIN: CPT | Performed by: PHYSICAL THERAPIST

## 2024-02-01 PROCEDURE — 97112 NEUROMUSCULAR REEDUCATION: CPT | Performed by: PHYSICAL THERAPIST

## 2024-02-01 NOTE — PROGRESS NOTES
Physical Therapy Initial Evaluation and Plan of Care      Patient: Zina Urban   : 1950  Diagnosis/ICD-10 Code:  Radiculopathy, cervical region [M54.12]  Referring practitioner: Goran Cuevas Sr., MD  Date of Initial Visit: 2024  Today's Date: 2024   Patient seen for 1 session  Location of Service: [place] [id number] [facility] - [address] [place] [address]       Visit Diagnoses:    ICD-10-CM ICD-9-CM   1. Radiculopathy, cervical region  M54.12 723.4   2. Chronic right shoulder pain  M25.511 719.41    G89.29 338.29   3. Segmental and somatic dysfunction of cervical region  M99.01 739.1   4. Weakness generalized  R53.1 780.79         Subjective  Chief Complaint/Subjective Report: Patient presented to the clinic today with complaints of pain in the right shoulder and neck that has been present chronically and worse since a fall with a humeral fracture on 05/10/24. Pt reported a PMH of previous shoulder fracture years ago , see scanned and additional documents for further or additional PMH not mentioned at PT today. Pt made no reports of CNS signs or symptoms, or indications of other sinister pathologies were given in the subjective history today.   Mechanism of Injury: Fall   Functional Limitations: ADLs, work-related activities  Subjective Goals for PT: Return to PLOF, decreased pain with ADLs and community activities  Prior Treatment for Current Condition: MD, PT  Imaging:Yes  Pain/VNRS (0-10/10): Worst: 8/10; Average: 4/10  Agg. Factors: Sitting, reading, lifting, reaching  Relieving Factors: Heat, TENS  Subjective Questionnaire: QuickDASH: 50%  PLOF: Independent with all functional tasks (transfers, ambulation, stair navigation, squatting, lifting, etc.), ADLs/ iADLs (bathing, dressing, house work, and other such tasks), and community activities (driving, shopping, wellness activities, etc.)   Occupation:  Student at [employer]  PMH: See Subjective Report and scanned  documentation   Precautions/Contraindications: No reported contraindications from subjective history today unless otherwise stated above.      Objective  AROM (% of Full --- * denotes degrees in place of % --- + denotes tested to be WFL)       -- Cervical Rotation Right:  70 Left:  75    -- Thoracic Rotation Right - Left -    -- Cervical Flexion:   85      -- Cervical Extension:  70                   AROM Shoulder (% of Full --- * denotes degrees in place of % --- + denotes tested to be WFL)      -- Shoulder Flexion Right: 70 Left: 95    -- Shoulder Abduction Right: 70 Left: 90     -- Shoulder ER at Side Right: 75 Left: 85  -- Shoulder IR Behind Back: 75 Left: 90    UE MMT (0-5/5 --- + denotes WFL)  -- Shoulder Flexion Right: 4-/5 Left: 4+/5  -- Shoulder Abduction Right: 4/5 Left: 4+/5  -- Shoulder ER at Side Right: 4-/5 Left: 4+/5  -- Shoulder IR Behind Back: -/5 Left: -/5  -- Elbow Flexion Right: 4/5 Left: 4+/5  -- Elbow Extension Right  4+/5 Left: 5-/5  -- Wrist Flexion Right:  -/5 Left: -/5  -- Wrist Extension Right: -/5 Left: -/5    +      Functional Assessment: Impaired overhead reaching and lifting; restricted range and notable fatigue with >3 repetitions with 3lbs     Exercise and Interventions:  Access Code: [email or web address]  URL: [email or web address]  Date: 02/01/2024  Prepared by: Yordan Edwards    Exercises  - Seated Assisted Cervical Rotation with Towel  - 2 x daily - 7 x weekly - 2-3 sets - 6-8 reps  - Cervical Extension AROM with Strap  - 2 x daily - 7 x weekly - 2-3 sets - 6-8 reps  - Standing Shoulder Flexion AAROM with Dowel  - 2 x daily - 7 x weekly - 3 sets - 8 reps  - Radial nerve ball passes  - 1 x daily - 7 x weekly - 3 sets - 10 reps  - Seated Thoracic Extension Arms Overhead  - 1 x daily - 7 x weekly - 3 sets - 8 reps  - Face Pulls  - 1 x daily - 3-4 x weekly - 2-3 sets - 6-10 reps  - Half Kneel Row Y  - 1 x daily - 7 x weekly - 3 sets - 10 reps  - Squat Lift to Overhead Press (Clean and  Press)  - 1 x daily - 7 x weekly - 3 sets - 10 reps  - Therapeutic Neuroscience Education - Education on Pain Neuroscience, graded motor exposure, pain threshold, alarm system and training levels, and progressive loading - 10 mins      Documentation of Assessment Details: Patient presented the clinic with signs and symptoms consistent with cervical radiating referred pain with segmental dysfunction of the UE. Patient demonstrated limitations and impairments mobility, strength, and activity tolerance during today's evaluation, and will benefit from skilled PT address current limitations and impairments to help patient regain functional mobility and strength necessary to return to PLOF, reduce pain, and improve current symptoms as patient progresses towards meeting current goals established at therapy today. The patient present with no comorbidities or personal factors that impact my POC and deficit in above mentioned areas. Based on these findings and results from valid tests and measures, I am classifying this patient's presentation as stable with uncomplicated characteristics, and a good prognosis for recovery.         Assessment & Plan       Assessment  Impairments: abnormal gait, abnormal or restricted ROM, activity intolerance, impaired physical strength, lacks appropriate home exercise program and pain with function   Functional limitations: carrying objects, lifting, sleeping, walking, uncomfortable because of pain, sitting and standing   Prognosis details: Based on valid tests and measures performed today I am classifying this patient as presently stable with uncomplicated characteristics and good prognosis for recovery    Goals  Plan Goals: SHORT TERM GOALS (0-4 Weeks):  Pt will demonstrated independence and compliance with HEP within 2 weeks to demonstrate understanding of interventions and treatments helpful in reaching STG and LTG over the course of care.     Pt will improve Subjective assessment tool  (NDI/QDASH) by >20% within 4 weeks to demonstrate improvements in test and measure outcomes and overall functionality, and to show reduction of symptoms, improved activity tolerance, and ability to complete ADLs and work-related activities     Pt will improve functional mobility and pain free ROM in the neck and shoulder to demonstrate improved functional capacity and independence with ADLs, driving and community participation activities.    Pt will reported pain <5/10 with ADLs and normal daily activities for >2 days/week over the past week to demonstrate functional improvements and activity tolerance and reduction in symptom severity     Pt will report a reduction of symptoms locations (I.e. head, interscapular, shoulder, arm, or neck regions) of 1 or more within 4 weeks to demonstrate a reduction in symptom irritability and severity and improved functional activity tolerance.       LONG TERM GOALS (6-8+ Weeks)   Pt will improve functional mobility and pain free ROM to ranges that allow for pain free functional activities such as driving, overhead reaching, dressing, bathing, and completing ADLs within 8 weeks to demonstrate improvements in functional independence, mobility, and community participation to allow for a return to PLOF.    Pt will demonstrate a 25% improvement in cervical and shoulder ROMs within 8 weeks to demonstrate improvements in functional mobility and ability to complete ADLs and work-related activities Independently.     Pt will reported pain <3/10 with ADLs and normal daily activities for >5 days/week over the past week to demonstrate functional improvements and activity tolerance and reduction in symptom severity     Pt will improve Subjective assessment tool (NDI/QDASH) by >50%  over baseline to demonstrate improvements in test and measure outcomes and overall functionality, and to show reduction of symptoms, improved activity tolerance, and ability to complete ADLs and work-related  activities     Pt will be able to lift and carry objects >30lbs without worsening of symptoms to demonstrate improvements in functional strength for ease of home and community tasks and improved functional independence.          Plan  Therapy options: will be seen for skilled therapy services  Planned modality interventions: dry needling, TENS, high voltage pulsed current (pain management), electrical stimulation/Russian stimulation and cryotherapy  Planned therapy interventions: ADL retraining, abdominal trunk stabilization, manual therapy, neuromuscular re-education, balance/weight-bearing training, body mechanics training, soft tissue mobilization, spinal/joint mobilization, joint mobilization, home exercise program, gait training, functional ROM exercises, strengthening, therapeutic activities and transfer training  Treatment plan discussed with: patient  Plan details: Duration: 2-3x/Wk for 4 Weeks - Upon completion of 4 weeks further evaluation and assessment will determine ongoing plan for continued care.    Continue with skilled physical therapy addressing previously mentioned limitations and impairments; progress HEP as tolerated; progress functional strengthening interventions to tolerance.    History # of Personal Factors and/or Comorbidities: LOW (0)  Examination of Body System(s): # of elements: LOW (1-2)  Clinical Presentation: STABLE   Clinical Decision Making: LOW       Timed:         Manual Therapy:    -     mins  50087;     Therapeutic Exercise:    15     mins  61071;     Neuromuscular Usman:    10    mins  40114;    Therapeutic Activity:     -     mins  68333;     Gait Training:           mins  69060;     Ultrasound:          mins  00999;    Ionto                                  mins   89120  Self Care                           mins   39601  Canalith Repos         mins 95750    Un-Timed:  Electrical Stimulation:          mins  19702 ( );  Dry Needling         mins self-pay  Traction          mins 17763  Low Eval    20     Mins  00288  Mod Eval         Mins  18553  High Eval                            Mins  15345    Timed Treatment:   25   mins   Total Treatment:     45   mins      PT: Yordan Edwards PT     License Number: [id number]  Electronically signed by Yordan Edwards PT, 02/01/24, 11:52 AM EST    Certification Period: 2/1/2024 thru 4/30/2024  I certify that the therapy services are furnished while this patient is under my care.  The services outlined above are required by this patient, and will be reviewed every 90 days.         Physician Signature:__________________________________________________    PHYSICIAN: Goran Cuevas Sr., MD  NPI: [id number]                                      DATE:      Please sign and return via fax to [email or web address] . Thank you, [facility].

## 2024-02-05 ENCOUNTER — TREATMENT (OUTPATIENT)
Dept: PHYSICAL THERAPY | Facility: CLINIC | Age: 74
End: 2024-02-05
Payer: MEDICARE

## 2024-02-05 DIAGNOSIS — M99.01 SEGMENTAL AND SOMATIC DYSFUNCTION OF CERVICAL REGION: ICD-10-CM

## 2024-02-05 DIAGNOSIS — M54.12 RADICULOPATHY, CERVICAL REGION: Primary | ICD-10-CM

## 2024-02-05 DIAGNOSIS — R53.1 WEAKNESS GENERALIZED: ICD-10-CM

## 2024-02-05 DIAGNOSIS — G89.29 CHRONIC RIGHT SHOULDER PAIN: ICD-10-CM

## 2024-02-05 DIAGNOSIS — M25.511 CHRONIC RIGHT SHOULDER PAIN: ICD-10-CM

## 2024-02-08 NOTE — PROGRESS NOTES
Physical Therapy Daily Note    Patient: Zina Urban   : 1950  Diagnosis/ICD-10 Code:  Radiculopathy, cervical region [M54.12]  Referring practitioner: Goran Cuevas Sr., MD  Date of Initial Visit: Type: THERAPY  Noted: 2024  Today's Date: 2024  Patient seen for 2 session  Location of Service: [facility]   [address] [facility] - [address] [place] [address]       Visit Diagnoses:    ICD-10-CM ICD-9-CM   1. Radiculopathy, cervical region  M54.12 723.4   2. Chronic right shoulder pain  M25.511 719.41    G89.29 338.29   3. Segmental and somatic dysfunction of cervical region  M99.01 739.1   4. Weakness generalized  R53.1 780.79            Subjective  Zina Urban reported today that she is doing better, feels like things are improving    Objective   No functional measures updated at today's visit unless otherwise stated. For functional assessment and documentation of patient progressions referred to the assessment section.    See Exercise, Manual, and Modality Logs for complete treatments.       Assessment/Plan  Started treatment today with dynamic warm-up to prepare pt for interventions; tx today continued with end range mobility, neurodynamic mobility, and functional strengthening, which pt tolerated well and without complaint. Pt continues to have complaints in the aforementioned area, and continues to display limitations and impairments previously noted; pt will continue to benefit from ongoing skilled PT to help pt continue to progress towards current LTGs and return to PLOF.    Plan: Continue with current treatment plan and progressions to reach goals established and previous evaluation/assessment         Timed:         Manual Therapy:         mins  04603;     Therapeutic Exercise:    20     mins  50179;     Neuromuscular Usman:    10    mins  19065;    Therapeutic Activity:     10     mins  39943;     Gait Training:           mins  48334;     Ultrasound:           mins  08986;    Ionto                                   mins   27825  Self Care                            mins   70288  Canalith Repos         mins 17375    Un-Timed:  Electrical Stimulation:         mins  57621 ( );  Dry Needling          mins self-pay  Traction          mins 67068  Low Eval          Mins  11215  Mod Eval          Mins  76971  High Eval                            Mins  00573      Timed Treatment:   40   mins   Total Treatment:     40   mins      PT: Yordan Edwards PT     License Number: [id number]  Electronically signed by Yordan Edwards PT, 02/08/24, 11:54 AM EST

## 2024-02-09 ENCOUNTER — TREATMENT (OUTPATIENT)
Dept: PHYSICAL THERAPY | Facility: CLINIC | Age: 74
End: 2024-02-09
Payer: MEDICARE

## 2024-02-09 DIAGNOSIS — M25.511 CHRONIC RIGHT SHOULDER PAIN: ICD-10-CM

## 2024-02-09 DIAGNOSIS — R53.1 WEAKNESS GENERALIZED: ICD-10-CM

## 2024-02-09 DIAGNOSIS — G89.29 CHRONIC RIGHT SHOULDER PAIN: ICD-10-CM

## 2024-02-09 DIAGNOSIS — M54.12 RADICULOPATHY, CERVICAL REGION: Primary | ICD-10-CM

## 2024-02-09 DIAGNOSIS — M99.01 SEGMENTAL AND SOMATIC DYSFUNCTION OF CERVICAL REGION: ICD-10-CM

## 2024-02-09 NOTE — PROGRESS NOTES
Physical Therapy Daily Note    Patient: Zina Urban   : 1950  Diagnosis/ICD-10 Code:  Radiculopathy, cervical region [M54.12]  Referring practitioner: Goran Cuevas Sr., MD  Date of Initial Visit: Type: THERAPY  Noted: 2024  Today's Date: 2024   Patient seen for 3 session  Location of Service: [place] [id number] [facility] - [address] [place] [address]       Visit Diagnoses:    ICD-10-CM ICD-9-CM   1. Radiculopathy, cervical region  M54.12 723.4   2. Chronic right shoulder pain  M25.511 719.41    G89.29 338.29   3. Weakness generalized  R53.1 780.79   4. Segmental and somatic dysfunction of cervical region  M99.01 739.1            Subjective  Zina Urban reported today that there was more stiffness than usual in her R shoulder, but it's due to the earlier   (than normal) session and decreased amount of productivity this morning.Compared to coming later in the afternoon and doing household chores prior to therapy.    Objective   No functional measures updated at today's visit unless otherwise stated. For functional assessment and documentation of patient progressions referred to the assessment section.    See Exercise, Manual, and Modality Logs for complete treatments.       Assessment/Plan  During today's session, pt completed exercises with progressions and continued interventions without compensations or complaints of pain. Their strength is improving within the R shoulder which means pt is on the right track to completing their goals set during the initial eval. This will allow them to participate in community activities however they please without continued rest breaks or leaving earlier than planned. Therapy will continue per-usual with the right modifications made as needed in order to improve overall quality of life, increase in strength, and increase in endurance.             Timed:         Manual Therapy:    0     mins  70395;     Therapeutic  Exercise:    15     mins  90426;     Neuromuscular Usman:    15    mins  30313;    Therapeutic Activity:     8     mins  61474;     Gait Training:           mins  79820;     Ultrasound:          mins  50478;    Ionto                                   mins   32691  Self Care                            mins   65692  Canalith Repos         mins 84331    Un-Timed:  Electrical Stimulation:         mins  46973 ( );  Dry Needling     0     mins self-pay  Traction          mins 73808  Low Eval          Mins  03013  Mod Eval          Mins  18843  High Eval                            Mins  36995      Timed Treatment:   38   mins   Total Treatment:     38   mins      PT: Yordan Edwards PT               License Number: [id number]  Electronically signed by Yordan Edwards PT, 02/09/24, 1:51PM

## 2024-02-09 NOTE — PROGRESS NOTES
Physical Therapy Daily Note    Patient: Zina Urban   : 1950  Diagnosis/ICD-10 Code:  Radiculopathy, cervical region [M54.12]  Referring practitioner: Goran Cuevas Sr., MD  Date of Initial Visit: Type: THERAPY  Noted: 2024  Today's Date: 2024   Patient seen for 3 session  Location of Service: [place] [id number] [facility] - [address] [place] [address]       Visit Diagnoses:    ICD-10-CM ICD-9-CM   1. Radiculopathy, cervical region  M54.12 723.4   2. Chronic right shoulder pain  M25.511 719.41    G89.29 338.29   3. Weakness generalized  R53.1 780.79   4. Segmental and somatic dysfunction of cervical region  M99.01 739.1            Subjective  Zina Urban reported today that there was more stiffness than usual in her R shoulder, but it's due to the earlier   (than normal) session and decreased amount of productivity this morning.Compared to coming later in the afternoon and doing household chores prior to therapy.    Objective   No functional measures updated at today's visit unless otherwise stated. For functional assessment and documentation of patient progressions referred to the assessment section.    See Exercise, Manual, and Modality Logs for complete treatments.       Assessment/Plan  During today's session, pt completed exercises with progressions and continued interventions without compensations or complaints of pain. Their strength is improving within the R shoulder which means pt is on the right track to completing their goals set during the initial eval. This will allow them to participate in community activities however they please without continued rest breaks or leaving earlier than planned. Therapy will continue per-usual with the right modifications made as needed in order to improve overall quality of life, increase in strength, and increase in endurance.             Timed:         Manual Therapy:    0     mins  17833;     Therapeutic  Exercise:    15     mins  19203;     Neuromuscular Usman:    15    mins  34917;    Therapeutic Activity:     8     mins  53809;     Gait Training:           mins  48093;     Ultrasound:          mins  99004;    Ionto                                   mins   68939  Self Care                            mins   04914  Canalith Repos         mins 79709    Un-Timed:  Electrical Stimulation:         mins  72170 ( );  Dry Needling     0     mins self-pay  Traction          mins 47572  Low Eval          Mins  20449  Mod Eval          Mins  27874  High Eval                            Mins  38893      Timed Treatment:   38   mins   Total Treatment:     38   mins

## 2024-02-12 ENCOUNTER — TREATMENT (OUTPATIENT)
Dept: PHYSICAL THERAPY | Facility: CLINIC | Age: 74
End: 2024-02-12
Payer: MEDICARE

## 2024-02-12 DIAGNOSIS — M54.12 RADICULOPATHY, CERVICAL REGION: Primary | ICD-10-CM

## 2024-02-12 DIAGNOSIS — M25.511 CHRONIC RIGHT SHOULDER PAIN: ICD-10-CM

## 2024-02-12 DIAGNOSIS — R53.1 WEAKNESS GENERALIZED: ICD-10-CM

## 2024-02-12 DIAGNOSIS — M99.01 SEGMENTAL AND SOMATIC DYSFUNCTION OF CERVICAL REGION: ICD-10-CM

## 2024-02-12 DIAGNOSIS — G89.29 CHRONIC RIGHT SHOULDER PAIN: ICD-10-CM

## 2024-02-19 ENCOUNTER — TREATMENT (OUTPATIENT)
Dept: PHYSICAL THERAPY | Facility: CLINIC | Age: 74
End: 2024-02-19
Payer: MEDICARE

## 2024-02-19 DIAGNOSIS — M99.01 SEGMENTAL AND SOMATIC DYSFUNCTION OF CERVICAL REGION: ICD-10-CM

## 2024-02-19 DIAGNOSIS — G89.29 CHRONIC RIGHT SHOULDER PAIN: ICD-10-CM

## 2024-02-19 DIAGNOSIS — M25.511 CHRONIC RIGHT SHOULDER PAIN: ICD-10-CM

## 2024-02-19 DIAGNOSIS — M54.12 RADICULOPATHY, CERVICAL REGION: Primary | ICD-10-CM

## 2024-02-19 DIAGNOSIS — R53.1 WEAKNESS GENERALIZED: ICD-10-CM

## 2024-02-22 ENCOUNTER — TREATMENT (OUTPATIENT)
Dept: PHYSICAL THERAPY | Facility: CLINIC | Age: 74
End: 2024-02-22
Payer: MEDICARE

## 2024-02-22 DIAGNOSIS — R53.1 WEAKNESS GENERALIZED: ICD-10-CM

## 2024-02-22 DIAGNOSIS — G89.29 CHRONIC RIGHT SHOULDER PAIN: ICD-10-CM

## 2024-02-22 DIAGNOSIS — M25.511 CHRONIC RIGHT SHOULDER PAIN: ICD-10-CM

## 2024-02-22 DIAGNOSIS — M99.01 SEGMENTAL AND SOMATIC DYSFUNCTION OF CERVICAL REGION: ICD-10-CM

## 2024-02-22 DIAGNOSIS — M54.12 RADICULOPATHY, CERVICAL REGION: Primary | ICD-10-CM

## 2024-02-22 NOTE — PROGRESS NOTES
Physical Therapy Daily Note    Patient: Zina Urban   : 1950  Diagnosis/ICD-10 Code:  Radiculopathy, cervical region [M54.12]  Referring practitioner: Goran Cuevas Sr., MD  Date of Initial Visit: Type: THERAPY  Noted: 2024  Today's Date: 2024  Patient seen for 5 session  Location of Service: [place] [id number] [facility] - [address] [place] [address]       Visit Diagnoses:    ICD-10-CM ICD-9-CM   1. Radiculopathy, cervical region  M54.12 723.4   2. Chronic right shoulder pain  M25.511 719.41    G89.29 338.29   3. Weakness generalized  R53.1 780.79   4. Segmental and somatic dysfunction of cervical region  M99.01 739.1            Subjective  Zina Urban reported today that she is doing well, sore for a bit after last session, but feels like things are improving    Objective   No functional measures updated at today's visit unless otherwise stated. For functional assessment and documentation of patient progressions referred to the assessment section.    See Exercise, Manual, and Modality Logs for complete treatments.       Assessment/Plan  Treatment today continued with progressive strengthening and dynamic stability. Pt continues to have difficulty with lifting and UQ strength, but has demonstrated mild improvements thus far with PT. Pt continues to have limitations in functional strength and mobility, and will continue to benefit from ongoing skilled PT to help pt continue to progress towards current LTGs and return to PLOF.    Plan: Continue with current treatment plan and progressions to reach goals established and previous evaluation/assessment         Timed:         Manual Therapy:         mins  51307;     Therapeutic Exercise:    20     mins  30267;     Neuromuscular Usman:    10    mins  57677;    Therapeutic Activity:     10     mins  42058;     Gait Training:           mins  10387;     Ultrasound:          mins  79324;    Ionto                                    mins   20662  Self Care                            mins   87447  Canalith Repos         mins 42372    Un-Timed:  Electrical Stimulation:         mins  07324 ( );  Dry Needling          mins self-pay  Traction          mins 74613  Low Eval          Mins  61051  Mod Eval          Mins  05033  High Eval                            Mins  14817      Timed Treatment:   40   mins   Total Treatment:     40   mins      PT: Yordan Edwards PT     License Number: [id number]  Electronically signed by Yordan Edwards PT, 02/22/24, 11:11 AM EST

## 2024-02-26 ENCOUNTER — TREATMENT (OUTPATIENT)
Dept: PHYSICAL THERAPY | Facility: CLINIC | Age: 74
End: 2024-02-26
Payer: MEDICARE

## 2024-02-26 DIAGNOSIS — R53.1 WEAKNESS GENERALIZED: ICD-10-CM

## 2024-02-26 DIAGNOSIS — M54.12 RADICULOPATHY, CERVICAL REGION: Primary | ICD-10-CM

## 2024-02-26 DIAGNOSIS — G89.29 CHRONIC RIGHT SHOULDER PAIN: ICD-10-CM

## 2024-02-26 DIAGNOSIS — M25.511 CHRONIC RIGHT SHOULDER PAIN: ICD-10-CM

## 2024-02-26 NOTE — PROGRESS NOTES
Physical Therapy Daily Note    Patient: Zina Urban   : 1950  Diagnosis/ICD-10 Code:  Radiculopathy, cervical region [M54.12]  Referring practitioner: Goran Cuevas Sr., MD  Date of Initial Visit: Type: THERAPY  Noted: 2024  Today's Date: 2024   Patient seen for 7 session  Location of Service: [place] [address] [facility] - [address] [place] [address]       Visit Diagnoses:    ICD-10-CM ICD-9-CM   1. Radiculopathy, cervical region  M54.12 723.4   2. Chronic right shoulder pain  M25.511 719.41    G89.29 338.29   3. Weakness generalized  R53.1 780.79            Subjective  Zina Urban reported today that she has noticed improvements in her shoulder impairments b/c of decreased recovery time, AROM, and when performing functional activities.      Objective   No functional measures updated at today's visit unless otherwise stated. For functional assessment and documentation of patient progressions referred to the assessment section.    See Exercise, Manual, and Modality Logs for complete treatments.       Assessment/Plan  During today's session, pt completed exercises with progressions and continued interventions without compensations or complaints of pain. Their strength is improving within the cervical/shoulder region per gross assessment. Pt is progressing well towards their goals established during the initial eval. Continuing skilled physical therapy in the future is crucial in order to make improvements in the impairments listed above, in order for them to regain there functional mobility and return to their PLOF.              Timed:         Manual Therapy:    0     mins  56257;     Therapeutic Exercise:    15     mins  97908;     Neuromuscular Usman:    10    mins  33290;    Therapeutic Activity:     15     mins  53185;     Gait Training:           mins  43560;     Ultrasound:          mins  96551;    Ionto                                   mins    80649  Self Care                            mins   26770  Canalith Repos         mins 88280    Un-Timed:  Electrical Stimulation:         mins  17138 ( );  Dry Needling     0     mins self-pay  Traction          mins 49412  Low Eval          Mins  19401  Mod Eval          Mins  39423  High Eval                            Mins  91066      Timed Treatment:   40   mins   Total Treatment:     40   mins        PT: Yordan Edwards PT     License Number: [id number]  Electronically signed by Karena Kam, ROBERT Student, 02/26/24, 12:23 PM EST

## 2024-02-27 NOTE — PROGRESS NOTES
Physical Therapy Daily Note    Patient: Zina Urban   : 1950  Diagnosis/ICD-10 Code:  Radiculopathy, cervical region [M54.12]  Referring practitioner: Goran Cuevas Sr., MD  Date of Initial Visit: Type: THERAPY  Noted: 2024  Today's Date: 2024  Patient seen for 6 session  Location of Service: [place] [id number] [facility] - [address] [place] [address]       Visit Diagnoses:    ICD-10-CM ICD-9-CM   1. Radiculopathy, cervical region  M54.12 723.4   2. Chronic right shoulder pain  M25.511 719.41    G89.29 338.29   3. Weakness generalized  R53.1 780.79   4. Segmental and somatic dysfunction of cervical region  M99.01 739.1            Subjective  Zina Urban reported today that she is doing better, feels like things are improving    Objective   No functional measures updated at today's visit unless otherwise stated. For functional assessment and documentation of patient progressions referred to the assessment section.    See Exercise, Manual, and Modality Logs for complete treatments.       Assessment/Plan  Started treatment today with dynamic warm-up to prepare pt for interventions; tx today continued with end range mobility, neurodynamic mobility, and functional strengthening, which pt tolerated well and without complaint. Pt continues to have complaints in the aforementioned area, and continues to display limitations and impairments previously noted; pt will continue to benefit from ongoing skilled PT to help pt continue to progress towards current LTGs and return to PLOF.    Plan: Continue with current treatment plan and progressions to reach goals established and previous evaluation/assessment         Timed:         Manual Therapy:         mins  73621;     Therapeutic Exercise:    20     mins  65984;     Neuromuscular Usman:    10    mins  29679;    Therapeutic Activity:     10     mins  62257;     Gait Training:           mins  52276;     Ultrasound:           mins  18714;    Ionto                                   mins   20866  Self Care                            mins   02360  Canalith Repos         mins 88817    Un-Timed:  Electrical Stimulation:         mins  33854 ( );  Dry Needling          mins self-pay  Traction          mins 14310  Low Eval          Mins  13504  Mod Eval          Mins  16087  High Eval                            Mins  43769      Timed Treatment:   40   mins   Total Treatment:     40   mins      PT: Yordan Edwards PT     License Number: [id number]  Electronically signed by Yordan Edwards PT, 02/27/24, 12:33 PM EST

## 2024-02-29 ENCOUNTER — OFFICE VISIT (OUTPATIENT)
Dept: FAMILY MEDICINE CLINIC | Facility: CLINIC | Age: 74
End: 2024-02-29
Payer: MEDICARE

## 2024-02-29 ENCOUNTER — TREATMENT (OUTPATIENT)
Dept: PHYSICAL THERAPY | Facility: CLINIC | Age: 74
End: 2024-02-29
Payer: MEDICARE

## 2024-02-29 VITALS
RESPIRATION RATE: 18 BRPM | SYSTOLIC BLOOD PRESSURE: 122 MMHG | HEIGHT: 64 IN | TEMPERATURE: 96.9 F | DIASTOLIC BLOOD PRESSURE: 68 MMHG | WEIGHT: 133 LBS | BODY MASS INDEX: 22.71 KG/M2

## 2024-02-29 DIAGNOSIS — M25.511 CHRONIC RIGHT SHOULDER PAIN: ICD-10-CM

## 2024-02-29 DIAGNOSIS — G89.29 CHRONIC RIGHT SHOULDER PAIN: ICD-10-CM

## 2024-02-29 DIAGNOSIS — E78.2 MIXED HYPERLIPIDEMIA: Primary | ICD-10-CM

## 2024-02-29 DIAGNOSIS — M54.12 RADICULOPATHY, CERVICAL REGION: Primary | ICD-10-CM

## 2024-02-29 NOTE — ASSESSMENT & PLAN NOTE
Lipid abnormalities are worsening    Plan:  Pt is refusing to take medication for her hyperlipidemia.      Discussed medication dosage, use, side effects, and goals of treatment in detail.    Counseled patient on lifestyle modifications to help control hyperlipidemia.     Patient Treatment Goals:   LDL goal is less than 70  LDL goal is under 100    Followup in 6 months.

## 2024-02-29 NOTE — PROGRESS NOTES
"Chief Complaint  Chief Complaint   Patient presents with    Hyperlipidemia     6 mon f/u       Subjective    History of Present Illness        Zina Urban presents to [facility] for   Hyperlipidemia  This is a chronic problem. The current episode started more than 1 year ago. The problem is uncontrolled. Recent lipid tests were reviewed and are high. She has no history of chronic renal disease, diabetes, hypothyroidism, liver disease or obesity. Pertinent negatives include no chest pain, leg pain, myalgias or shortness of breath. She is currently on no antihyperlipidemic treatment. The current treatment provides no improvement of lipids. There are no compliance problems.  Risk factors for coronary artery disease include dyslipidemia and family history.        Objective   Vital Signs:   Visit Vitals  /68   Temp 96.9 °F (36.1 °C)   Resp 18   Ht 162.6 cm (64\")   Wt 60.3 kg (133 lb)   BMI 22.83 kg/m²          BMI is within normal parameters. No other follow-up for BMI required.     Physical Exam  Vitals reviewed.   Constitutional:       Appearance: She is well-developed.   HENT:      Head: Normocephalic.      Right Ear: External ear normal.      Left Ear: External ear normal.      Nose: Nose normal.   Eyes:      Conjunctiva/sclera: Conjunctivae normal.   Cardiovascular:      Rate and Rhythm: Normal rate and regular rhythm.   Pulmonary:      Effort: Pulmonary effort is normal.      Breath sounds: Normal breath sounds.   Musculoskeletal:         General: Normal range of motion.      Cervical back: Normal range of motion and neck supple.   Skin:     General: Skin is warm and dry.      Capillary Refill: Capillary refill takes less than 2 seconds.   Neurological:      Mental Status: She is alert and oriented to person, place, and time.            Result Review :                    Assessment and Plan      Diagnoses and all orders for this visit:    1. Mixed hyperlipidemia " (Primary)  Assessment & Plan:   Lipid abnormalities are worsening    Plan:  Pt is refusing to take medication for her hyperlipidemia.      Discussed medication dosage, use, side effects, and goals of treatment in detail.    Counseled patient on lifestyle modifications to help control hyperlipidemia.     Patient Treatment Goals:   LDL goal is less than 70  LDL goal is under 100    Followup in 6 months.    Orders:  -     CBC & Differential  -     Comprehensive Metabolic Panel  -     TSH  -     Lipid Panel With / Chol / HDL Ratio             Follow Up   No follow-ups on file.  Patient was given instructions and counseling regarding her condition or for health maintenance advice. Please see specific information pulled into the AVS if appropriate.       Answers submitted by the patient for this visit:  Other (Submitted on 2/28/2024)  Please describe your symptoms.: PT follow up on shoulder  Have you had these symptoms before?: Yes  How long have you been having these symptoms?: Greater than 2 weeks  Please list any medications you are currently taking for this condition.: None  Please describe any probable cause for these symptoms. : Broken arm/scapula in May with complications in shoulde blade  Primary Reason for Visit (Submitted on 2/28/2024)  What is the primary reason for your visit?: Other

## 2024-02-29 NOTE — PROGRESS NOTES
Physical Therapy Daily Note    Patient: Zina Urban   : 1950  Diagnosis/ICD-10 Code:  Radiculopathy, cervical region [M54.12]  Referring practitioner: Goran Cuevas Sr., MD  Date of Initial Visit: Type: THERAPY  Noted: 2024  Today's Date: 2024   Patient seen for 8 session  Location of Service: [place] [address] [facility] - [address] [place] [address]       Visit Diagnoses:    ICD-10-CM ICD-9-CM   1. Radiculopathy, cervical region  M54.12 723.4   2. Chronic right shoulder pain  M25.511 719.41    G89.29 338.29            Subjective  Zina Urban reported today that yesterday she felt as if she took step's back due to outside stressors in her life causing an increase of pain to her R shoulder. But, expressed once she was able to figure out the causation, she recovered swiftly.     Objective   No functional measures updated at today's visit unless otherwise stated. For functional assessment and documentation of patient progressions referred to the assessment section.    See Exercise, Manual, and Modality Logs for complete treatments.       Assessment/Plan  Today's Tx focused on the impairments and limitations assessed during the initial eval. while also providing pt tolerant exercises that will challenge them, but won't increase their pain. Some modifications had to be made due to decreased strength in UE's, but with time, this will not be a reoccurring issue for the pt. With continued skilled therapy and compliance to HEP, pt will get closer to attaining their LT/STG's with each session.             Timed:         Manual Therapy:    0     mins  52319;     Therapeutic Exercise:    20     mins  18161;     Neuromuscular Usman:    10    mins  51831;    Therapeutic Activity:     20     mins  99748;     Gait Training:           mins  49417;     Ultrasound:          mins  33080;    Ionto                                   mins   15885  Self Care                             mins   38664  Canalith Repos         mins 53076    Un-Timed:  Electrical Stimulation:         mins  63162 ( );  Dry Needling     0     mins self-pay  Traction          mins 64567  Low Eval          Mins  78698  Mod Eval          Mins  93411  High Eval                            Mins  91133      Timed Treatment:   50   mins   Total Treatment:     50   mins        PT: Yordan Edwards PT     License Number: [id number]  Electronically signed by Karena Kam, ROBERT Student, 02/29/24, 3:12 PM EST

## 2024-03-07 ENCOUNTER — TREATMENT (OUTPATIENT)
Dept: PHYSICAL THERAPY | Facility: CLINIC | Age: 74
End: 2024-03-07
Payer: MEDICARE

## 2024-03-07 DIAGNOSIS — M54.12 RADICULOPATHY, CERVICAL REGION: Primary | ICD-10-CM

## 2024-03-07 DIAGNOSIS — G89.29 CHRONIC RIGHT SHOULDER PAIN: ICD-10-CM

## 2024-03-07 DIAGNOSIS — M25.511 CHRONIC RIGHT SHOULDER PAIN: ICD-10-CM

## 2024-03-07 NOTE — PROGRESS NOTES
Physical Therapy Daily Note    Patient: Zina Urban   : 1950  Diagnosis/ICD-10 Code:  Radiculopathy, cervical region [M54.12]  Referring practitioner: Goran Cuevas Sr., MD  Date of Initial Visit: Type: THERAPY  Noted: 2024  Today's Date: 2024   Patient seen for 9 session  Location of Service: [place] [address] [facility] - [address] [place] [address]       Visit Diagnoses:    ICD-10-CM ICD-9-CM   1. Radiculopathy, cervical region  M54.12 723.4   2. Chronic right shoulder pain  M25.511 719.41    G89.29 338.29            Subjective  Zina Urban reported today that there has been no noticeable changes since last visit. Would like to continue with the current intervention routine since pt is starting to notice slight improvements.     Objective   No functional measures updated at today's visit unless otherwise stated. For functional assessment and documentation of patient progressions referred to the assessment section.    See Exercise, Manual, and Modality Logs for complete treatments.       Assessment/Plan  During today's treatment session, pt completed exercises that were used to target supporting musculature around the injured region. Pt was able to tolerate exercises with minor compensations that were improved with cueing. They will continue skilled therapy as planned in order to help pt regain functional mobility and strength necessary to reduce symptoms and return to PLOF.     Progress treatment and HEP per POC and Protocol          Timed:         Manual Therapy:    0     mins  71303;     Therapeutic Exercise:    25     mins  01965;     Neuromuscular sUman:    0    mins  71024;    Therapeutic Activity:     15     mins  56768;     Gait Training:           mins  94428;     Ultrasound:          mins  02563;    Ionto                                   mins   87671  Self Care                            mins   56348  Canalith Repos         mins  31611    Un-Timed:  Electrical Stimulation:         mins  75175 ( );  Dry Needling     0     mins self-pay  Traction          mins 37434  Low Eval          Mins  62903  Mod Eval          Mins  65190  High Eval                            Mins  67018      Timed Treatment:   40   mins   Total Treatment:     40   mins      PT: Yordan Edwards PT     License Number: [id number]  Electronically signed by Yordan Edwards PT, 03/07/24, 4:45 PM EST

## 2024-03-11 ENCOUNTER — TREATMENT (OUTPATIENT)
Dept: PHYSICAL THERAPY | Facility: CLINIC | Age: 74
End: 2024-03-11
Payer: MEDICARE

## 2024-03-11 DIAGNOSIS — M99.01 SEGMENTAL AND SOMATIC DYSFUNCTION OF CERVICAL REGION: ICD-10-CM

## 2024-03-11 DIAGNOSIS — R53.1 WEAKNESS GENERALIZED: ICD-10-CM

## 2024-03-11 DIAGNOSIS — M54.12 RADICULOPATHY, CERVICAL REGION: Primary | ICD-10-CM

## 2024-03-11 DIAGNOSIS — G89.29 CHRONIC RIGHT SHOULDER PAIN: ICD-10-CM

## 2024-03-11 DIAGNOSIS — M25.511 CHRONIC RIGHT SHOULDER PAIN: ICD-10-CM

## 2024-03-14 ENCOUNTER — TELEPHONE (OUTPATIENT)
Dept: PHYSICAL THERAPY | Facility: CLINIC | Age: 74
End: 2024-03-14

## 2024-03-14 NOTE — TELEPHONE ENCOUNTER
Caller: Zina Urban    Relationship: Self         What was the call regarding: NOT FEELING WELL

## 2024-03-18 ENCOUNTER — TELEPHONE (OUTPATIENT)
Dept: PHYSICAL THERAPY | Facility: OTHER | Age: 74
End: 2024-03-18
Payer: MEDICARE

## 2024-03-18 NOTE — TELEPHONE ENCOUNTER
Caller: Zina Urban    Relationship: Self    What was the call regarding: PATIENT CANCELLED APPT TODAY DUE TO NOT FEELING WELL

## 2024-03-21 ENCOUNTER — TREATMENT (OUTPATIENT)
Dept: PHYSICAL THERAPY | Facility: CLINIC | Age: 74
End: 2024-03-21
Payer: MEDICARE

## 2024-03-21 DIAGNOSIS — M99.01 SEGMENTAL AND SOMATIC DYSFUNCTION OF CERVICAL REGION: ICD-10-CM

## 2024-03-21 DIAGNOSIS — M54.12 RADICULOPATHY, CERVICAL REGION: Primary | ICD-10-CM

## 2024-03-21 DIAGNOSIS — M25.511 CHRONIC RIGHT SHOULDER PAIN: ICD-10-CM

## 2024-03-21 DIAGNOSIS — R53.1 WEAKNESS GENERALIZED: ICD-10-CM

## 2024-03-21 DIAGNOSIS — G89.29 CHRONIC RIGHT SHOULDER PAIN: ICD-10-CM

## 2024-04-01 NOTE — PROGRESS NOTES
Physical Therapy Daily Note    Patient: Zina Urban   : 1950  Diagnosis/ICD-10 Code:  Radiculopathy, cervical region [M54.12]  Referring practitioner: Goran Cuevas Sr., MD  Date of Initial Visit: Type: THERAPY  Noted: 2024  Today's Date: 2024  Patient seen for 11 session  Location of Service: [place] [address] [facility] - [address] [place] [address]       Visit Diagnoses:    ICD-10-CM ICD-9-CM   1. Radiculopathy, cervical region  M54.12 723.4   2. Chronic right shoulder pain  M25.511 719.41    G89.29 338.29   3. Weakness generalized  R53.1 780.79   4. Segmental and somatic dysfunction of cervical region  M99.01 739.1            Subjective  Zina Urban reported today that she is doing well, sore for a bit after last session, but feels like things are improving    Objective   No functional measures updated at today's visit unless otherwise stated. For functional assessment and documentation of patient progressions referred to the assessment section.    See Exercise, Manual, and Modality Logs for complete treatments.       Assessment/Plan  Treatment today continued with progressive strengthening and dynamic stability. Pt continues to have difficulty with overhead shoulder mobility and cervicothoracic mobility, but has demonstrated moderate improvements thus far with PT. Pt continues to have limitations in functional strength and mobility, and will continue to benefit from ongoing skilled PT to help pt continue to progress towards current LTGs and return to PLOF.    Plan: Continue with current treatment plan and progressions to reach goals established and previous evaluation/assessment         Timed:         Manual Therapy:         mins  83957;     Therapeutic Exercise:    20     mins  96834;     Neuromuscular Usman:    10    mins  08619;    Therapeutic Activity:     10     mins  02529;     Gait Training:           mins  88329;     Ultrasound:          mins   76647;    Ionto                                   mins   50513  Self Care                            mins   81709  Canalith Repos         mins 17060    Un-Timed:  Electrical Stimulation:         mins  62005 ( );  Dry Needling          mins self-pay  Traction          mins 50348  Low Eval          Mins  47858  Mod Eval          Mins  04636  High Eval                            Mins  59741      Timed Treatment:   40   mins   Total Treatment:     40   mins      PT: Yordan Edwards PT     License Number: [id number]  Electronically signed by Yordan Edwards PT, 04/01/24, 7:14 AM EDT

## 2024-05-06 NOTE — PROGRESS NOTES
Physical Therapy Daily Note    Patient: Zina Urban   : 1950  Diagnosis/ICD-10 Code:  Radiculopathy, cervical region [M54.12]  Referring practitioner: Goran Cuevas Sr., MD  Date of Initial Visit: Type: THERAPY  Noted: 2024  Today's Date: 2024   Patient seen for 10 session  Location of Service: [address] [place] [address]       Visit Diagnoses:    ICD-10-CM ICD-9-CM   1. Radiculopathy, cervical region  M54.12 723.4   2. Chronic right shoulder pain  M25.511 719.41    G89.29 338.29   3. Weakness generalized  R53.1 780.79   4. Segmental and somatic dysfunction of cervical region  M99.01 739.1            Subjective  Zina Urban reported today that she's doing better, feels like things are improving, still having issues with lifting and overhead activities    Objective   No functional measures updated at today's visit unless otherwise stated. For functional assessment and documentation of patient progressions referred to the assessment section.    See Exercise, Manual, and Modality Logs for complete treatments.       Assessment/Plan  Treatment today consisted of functional strengthening interventions and soft tissue giovanna therapy techniques. Overhead mobility and UE strength were emphasized to address patient's limitations in UQ function. Patient tolerated treatment well today with no adverse events and is progressing well with current treatment plan. Patient will continue to benefit from PT addressing current limitations and impairments to help patient regain necessary function and meet current goals      Continue with current treatment plan and ongoing assessment; progress interventions to tolerance         Timed:         Manual Therapy:    -     mins  20175;     Therapeutic Exercise:    20     mins  11775;     Neuromuscular Usman:    10    mins  89528;    Therapeutic Activity:     15     mins  37324;     Gait Training:            mins  52807;     Ultrasound:          mins  58760;    Ionto                                   mins   58634  Self Care                            mins   55032  Canalith Repos         mins 99449    Un-Timed:  Electrical Stimulation:         mins  65426 ( );  Dry Needling     -     mins self-pay  Traction          mins 95317  Low Eval          Mins  75647  Mod Eval          Mins  24741  High Eval                            Mins  11315      Timed Treatment:   45   mins   Total Treatment:     45   mins      PT: Yordan Edwards PT     License Number: [id number]  Electronically signed by Yordan Edwards PT, 03/14/24, 4:13 PM EDT                    Performed By: Tanya Hernandez Urine Pregnancy Test Result: negative Lot # (Optional): [id number] Expiration Date (Optional): 01/28/2025 Detail Level: None

## 2024-07-15 ENCOUNTER — OFFICE VISIT (OUTPATIENT)
Dept: ORTHOPEDIC SURGERY | Facility: CLINIC | Age: 74
End: 2024-07-15
Payer: MEDICARE

## 2024-07-15 VITALS — WEIGHT: 125.8 LBS | HEIGHT: 64 IN | BODY MASS INDEX: 21.48 KG/M2 | TEMPERATURE: 98.6 F

## 2024-07-15 DIAGNOSIS — M25.512 LEFT SHOULDER PAIN, UNSPECIFIED CHRONICITY: Primary | ICD-10-CM

## 2024-07-15 DIAGNOSIS — G89.29 CHRONIC LEFT SHOULDER PAIN: ICD-10-CM

## 2024-07-15 DIAGNOSIS — M25.512 CHRONIC LEFT SHOULDER PAIN: ICD-10-CM

## 2024-07-15 PROCEDURE — 1160F RVW MEDS BY RX/DR IN RCRD: CPT | Performed by: ORTHOPAEDIC SURGERY

## 2024-07-15 PROCEDURE — 1159F MED LIST DOCD IN RCRD: CPT | Performed by: ORTHOPAEDIC SURGERY

## 2024-07-15 PROCEDURE — 99203 OFFICE O/P NEW LOW 30 MIN: CPT | Performed by: ORTHOPAEDIC SURGERY

## 2024-07-15 PROCEDURE — 20610 DRAIN/INJ JOINT/BURSA W/O US: CPT | Performed by: ORTHOPAEDIC SURGERY

## 2024-07-15 PROCEDURE — 73030 X-RAY EXAM OF SHOULDER: CPT | Performed by: ORTHOPAEDIC SURGERY

## 2024-07-15 RX ORDER — METHYLPREDNISOLONE ACETATE 80 MG/ML
80 INJECTION, SUSPENSION INTRA-ARTICULAR; INTRALESIONAL; INTRAMUSCULAR; SOFT TISSUE
Status: COMPLETED | OUTPATIENT
Start: 2024-07-15 | End: 2024-07-15

## 2024-07-15 RX ORDER — LIDOCAINE HYDROCHLORIDE 10 MG/ML
2 INJECTION, SOLUTION EPIDURAL; INFILTRATION; INTRACAUDAL; PERINEURAL
Status: COMPLETED | OUTPATIENT
Start: 2024-07-15 | End: 2024-07-15

## 2024-07-15 RX ADMIN — LIDOCAINE HYDROCHLORIDE 2 ML: 10 INJECTION, SOLUTION EPIDURAL; INFILTRATION; INTRACAUDAL; PERINEURAL at 15:17

## 2024-07-15 RX ADMIN — METHYLPREDNISOLONE ACETATE 80 MG: 80 INJECTION, SUSPENSION INTRA-ARTICULAR; INTRALESIONAL; INTRAMUSCULAR; SOFT TISSUE at 15:17

## 2024-07-15 NOTE — PROGRESS NOTES
Patient: Zina Urban    YOB: 1950    Medical Record Number: [id number]    Chief Complaints:  Left shoulder pain    History of Present Illness:     73 y.o. female patient who presents with a complaint of left shoulder pain and weakness. She reports that the symptoms first started earlier this year.  She tells me she fell in May 2023 and broke her right shoulder.  She was referred to PT as part of her rehab process.  She says that during the therapy process, she injured her left shoulder.  Pain is moderate, constant and aching.  The pain is worse with certain reaching and lifting movements as well as at night. She denies any alleviating factors. She denies any shooting pain down the arm, distal weakness, numbness or paresthesias.    Allergies   Allergen Reactions    Codeine Unknown - Low Severity     drowsiness       Current Outpatient Medications:     Magnesium Ascorbate powder, Multi mineral liquid, Disp: , Rfl:     Methylsulfonylmethane (MSM) 1000 MG capsule, , Disp: , Rfl:     Past Medical History:   Diagnosis Date    Allergic     About 14    Anemia     Anxiety     Cataract 2021    Glaucoma     Headache     No longer    Hyperlipidemia     Hypertension     When ill    Scoliosis     Urinary tract infection     Visual impairment     Far sighted       Past Surgical History:   Procedure Laterality Date    CLOSED REDUCTION AND PERCUTANEOUS PINNING OF HUMERUS FRACTURE      05/2023    EYE SURGERY      OOPHORECTOMY      SUBTOTAL HYSTERECTOMY  1994    Energency surgery ovary       Social History     Occupational History    Not on file   Tobacco Use    Smoking status: Never    Smokeless tobacco: Never   Vaping Use    Vaping status: Never Used   Substance and Sexual Activity    Alcohol use: Not Currently    Drug use: Never    Sexual activity: Not Currently     Partners: Male     Birth control/protection: Post-menopausal      Social History     Social History Narrative    Not on file       Family  "History   Problem Relation Age of Onset    Heart failure Father     Glaucoma Father     Kidney disease Father     Heart disease Father     Stroke Father     Vision loss Father     Cancer Paternal Grandmother         Bone, lungs    Hypertension Mother     Depression Mother     Stroke Mother     Other Maternal Grandmother         Ovarisn cancer gall bladder       Review of Systems:      Constitutional: Denies fever, shaking or chills   Eyes: Denies change in visual acuity   HEENT: Denies nasal congestion or sore throat   Respiratory: Denies cough or shortness of breath   Cardiovascular: Denies chest pain or edema  Endocrine: Denies tremors, palpitations, intolerance of heat or cold, polyuria, polydipsia.  GI: Denies abdominal pain, nausea, vomiting, bloody stools or diarrhea  : Denies frequency, urgency, incontinence, retention, or nocturia.  Musculoskeletal: Denies numbness, tingling or loss of motor function except as above  Integument: Denies rash, lesion or ulceration   Neurologic: Denies headache or focal weakness, deficits  Heme: Denies spontaneous or excessive bleeding, epistaxis, hematuria, melena, fatigue, enlarged or tender lymph nodes.      All other pertinent positives and negatives as noted above in HPI.    Physical Exam:   73 y.o. female    Vitals:    07/15/24 1446   Temp: 98.6 °F (37 °C)   TempSrc: Temporal   Weight: 57.1 kg (125 lb 12.8 oz)   Height: 162.6 cm (64\")     General:  Patient is awake and alert.  Appears in no acute distress or discomfort.    Psych:  Affect and demeanor are appropriate.    Cardiovascular:  Regular rate and rhythm.    Lungs:  Good chest expansion.  Breathing unlabored.    Lymph:  No palpable adenopathy about neck or axilla.    Neck:  Supple.  Normal ROM.  Negative Spurling's for shoulder or arm pain.    Left upper extremity:  Skin is benign.  No gross abnormalities on inspection including any atrophy, swellings, or masses.  No palpable masses or adenopathy.  No focal areas " of significant tenderness.  Full shoulder motion.  No evident instability or apprehension.  Mildly positive Neer, Fernandez.  Weakness with forward elevation in the scapular plane.  Good strength with internal and external rotation.  Good strength in the deltoid, biceps, triceps, and .  Intact sensation throughout the arm.  Brisk cap refill.  Palpable radial pulse.  Good skin turgor.         Radiology:   AP, scapular Y, and axillary views of the left shoulder are ordered by myself and reviewed to evaluate the patient's complaint.  No comparison films are immediately available.  The x-rays show no obvious acute abnormalities, lesions, masses, significant degenerative changes, or other concerning findings.  The acromiohumeral interval is normal.  Glenoid version appears normal as well.    Assessment/Plan:   Left shoulder pain and weakness, suspected rotator cuff tear    We discussed the natural history of rotator cuff tears and risk of potential tear progression.  We thoroughly discussed options in detail including a trial of conservative treatment versus further work-up and potential surgical options. She has acknowledged understanding of this information.  She would prefer to pursue nonsurgical management at this time.  The risk, benefits and alternatives to an injection were thoroughly discussed. She consented and the injection was performed as described below.  I offered her a referral to physical therapy. She declined because she feels like it was the therapy that started this problem in the first place.  She will follow-up with me as needed going forward.    Large Joint Arthrocentesis: L subacromial bursa  Date/Time: 7/15/2024 3:17 PM  Consent given by: patient  Site marked: site marked  Timeout: Immediately prior to procedure a time out was called to verify the correct patient, procedure, equipment, support staff and site/side marked as required   Supporting Documentation  Indications: pain   Procedure  Details  Location: shoulder - L subacromial bursa  Preparation: Patient was prepped and draped in the usual sterile fashion  Needle gauge: 21G.  Approach: posterior  Medications administered: 80 mg methylPREDNISolone acetate 80 MG/ML; 2 mL lidocaine PF 1% 1 %  Patient tolerance: patient tolerated the procedure well with no immediate complications         Sergey Green MD    07/15/2024    CC to Goran Cuevas Sr., MD

## 2024-07-16 ENCOUNTER — TELEPHONE (OUTPATIENT)
Dept: ORTHOPEDIC SURGERY | Facility: CLINIC | Age: 74
End: 2024-07-16
Payer: MEDICARE

## 2024-07-16 ENCOUNTER — PATIENT ROUNDING (BHMG ONLY) (OUTPATIENT)
Dept: ORTHOPEDIC SURGERY | Facility: CLINIC | Age: 74
End: 2024-07-16
Payer: MEDICARE

## 2024-07-16 DIAGNOSIS — G89.29 CHRONIC LEFT SHOULDER PAIN: ICD-10-CM

## 2024-07-16 DIAGNOSIS — M25.512 CHRONIC LEFT SHOULDER PAIN: ICD-10-CM

## 2024-07-16 DIAGNOSIS — M25.512 LEFT SHOULDER PAIN, UNSPECIFIED CHRONICITY: Primary | ICD-10-CM

## 2024-07-16 RX ORDER — TRAMADOL HYDROCHLORIDE 50 MG/1
TABLET ORAL
Qty: 12 TABLET | Refills: 0 | Status: SHIPPED | OUTPATIENT
Start: 2024-07-16

## 2024-07-16 NOTE — TELEPHONE ENCOUNTER
Hub staff attempted to follow warm transfer process and was unsuccessful     Caller: Zina Urban    Relationship to patient: Self    Best call back number: [phone]    Patient is needing: RETURNING CALL TO MISHA         
Caller: Zina Urban    Relationship to patient: Self    Best call back number: [phone] (home)     Patient is needing: PATIENT STATES SHE WAS TOLD TO COME BACK TO SEE DR SHAHID IN TWO WEEKS FOR HER LEFT SHOULDER   THE PN STATES SOMETHING DIFFERENT.   PATIENT WANTS TO GET WORKED IN FOR TWO WEEKS FROM 7/15/24.     PATIENT ALSO WANTS PAIN MEDS FOR THE PAIN   [facility] ON [address] IN [place]   
LVM to call back.   
Spoke to patient, Informed of tramadol prescription and confirmed pharmacy.   
Spoke to patient, informed of message. She states she will call back in 2 weeks. Patient states she will wait for MRI. Patient states she would like some pain medication because it is very painful. She states her arm is worse after the injection.    I did inform her that we don't typically prescribe pain medication after an injection, she stated she didn't expect  to not get any pain medication. Informed patient that I would reach out to provider and call her back once I hear back.     Patient states understanding   
14-Jul-2021 01:06

## 2024-07-16 NOTE — TELEPHONE ENCOUNTER
Caller: Zina Urban    Relationship: Self    Best call back number: [phone]    What orders are you requesting (i.e. lab or imaging): MRI LEFT SHOULDER    In what timeframe would the patient need to come in: ASAP    Where will you receive your lab/imaging services: [place]    Additional notes: PT STATES PAIN HAS GOTTEN WORSE SINCE YESTERDAY AND WOULD LIKE TO HAVE MRI ORDERED.

## 2024-07-17 DIAGNOSIS — M25.512 CHRONIC LEFT SHOULDER PAIN: Primary | ICD-10-CM

## 2024-07-17 DIAGNOSIS — G89.29 CHRONIC LEFT SHOULDER PAIN: Primary | ICD-10-CM

## 2024-07-17 RX ORDER — METHYLPREDNISOLONE 4 MG/1
TABLET ORAL
Qty: 21 TABLET | Refills: 0 | Status: SHIPPED | OUTPATIENT
Start: 2024-07-17

## 2024-07-17 NOTE — TELEPHONE ENCOUNTER
Unable to leave pt a voicemail, but sent her a MyChart message and she wishes to proceed with a medrol dose pack.

## 2024-07-17 NOTE — TELEPHONE ENCOUNTER
----- Message from [facility] [employer] sent at 7/17/2024 11:33 AM EDT -----  Regarding: Message from Dr. Green  Contact: [phone]  Okay. TY. Yes send.  Appreciate all of you!

## 2024-08-27 ENCOUNTER — HOSPITAL ENCOUNTER (OUTPATIENT)
Dept: MRI IMAGING | Facility: HOSPITAL | Age: 74
Discharge: HOME | End: 2024-08-27
Admitting: ORTHOPAEDIC SURGERY
Payer: MEDICARE

## 2024-08-27 DIAGNOSIS — G89.29 CHRONIC LEFT SHOULDER PAIN: ICD-10-CM

## 2024-08-27 DIAGNOSIS — M25.512 CHRONIC LEFT SHOULDER PAIN: ICD-10-CM

## 2024-08-27 PROCEDURE — 73221 MRI JOINT UPR EXTREM W/O DYE: CPT

## 2024-09-03 ENCOUNTER — TELEPHONE (OUTPATIENT)
Dept: ORTHOPEDIC SURGERY | Facility: HOSPITAL | Age: 74
End: 2024-09-03
Payer: MEDICARE

## 2024-09-03 ENCOUNTER — TELEPHONE (OUTPATIENT)
Dept: ORTHOPEDIC SURGERY | Facility: CLINIC | Age: 74
End: 2024-09-03
Payer: MEDICARE

## 2024-09-03 NOTE — TELEPHONE ENCOUNTER
I tried calling her to discuss the MRI results.  I have tried her 2 or 3 times previously including multiple attempts last week.  I left her a lengthy message and asked her to please reach out to us and let us know if there is a better number or better time to call her and I told her that we would try to accommodate her.  I explained that I would be happy to see her in the office to discuss these results as well.  I told her I may have my nurse practitioner try reaching out to her again later this week but if she could reach out to us and let us know if there is a better way or better time to reach her then again we will try to accommodate her.

## 2024-09-03 NOTE — TELEPHONE ENCOUNTER
----- Message from [facility] [employer] sent at 9/3/2024  2:27 PM EDT -----  Regarding: Appointment Request  Contact: [phone]  he doesnt let  me know when he is going to call. he just does it.  two are 'unknown' numbers and the last number left no way to contact him. I was dropped 3 times this morning trying to return a call.  someone needs to set me up for an appt

## 2024-09-04 ENCOUNTER — TELEPHONE (OUTPATIENT)
Dept: ORTHOPEDIC SURGERY | Facility: CLINIC | Age: 74
End: 2024-09-04
Payer: MEDICARE

## 2024-09-04 NOTE — TELEPHONE ENCOUNTER
I spoke with her.  We discussed her MRI results in detail.  I recommend we get her an appointment so that I can evaluate her again and then we can discuss face-to-face.

## 2024-09-16 ENCOUNTER — OFFICE VISIT (OUTPATIENT)
Dept: ORTHOPEDIC SURGERY | Facility: CLINIC | Age: 74
End: 2024-09-16
Payer: MEDICARE

## 2024-09-16 VITALS — TEMPERATURE: 98.7 F | WEIGHT: 124.6 LBS | HEIGHT: 64 IN | BODY MASS INDEX: 21.27 KG/M2

## 2024-09-16 DIAGNOSIS — M75.02 ADHESIVE CAPSULITIS OF LEFT SHOULDER: Primary | ICD-10-CM

## 2024-09-16 PROCEDURE — 99213 OFFICE O/P EST LOW 20 MIN: CPT | Performed by: ORTHOPAEDIC SURGERY

## 2024-09-25 ENCOUNTER — TREATMENT (OUTPATIENT)
Dept: PHYSICAL THERAPY | Facility: CLINIC | Age: 74
End: 2024-09-25
Payer: MEDICARE

## 2024-09-25 DIAGNOSIS — M79.602 PAIN OF LEFT UPPER EXTREMITY: ICD-10-CM

## 2024-09-25 DIAGNOSIS — R53.1 WEAKNESS GENERALIZED: ICD-10-CM

## 2024-09-25 DIAGNOSIS — G89.29 CHRONIC LEFT SHOULDER PAIN: Primary | ICD-10-CM

## 2024-09-25 DIAGNOSIS — M54.12 RADICULOPATHY, CERVICAL REGION: ICD-10-CM

## 2024-09-25 DIAGNOSIS — M25.512 CHRONIC LEFT SHOULDER PAIN: Primary | ICD-10-CM

## 2024-09-25 PROCEDURE — 97110 THERAPEUTIC EXERCISES: CPT | Performed by: PHYSICAL THERAPIST

## 2024-09-25 PROCEDURE — 97112 NEUROMUSCULAR REEDUCATION: CPT | Performed by: PHYSICAL THERAPIST

## 2024-09-25 PROCEDURE — 97161 PT EVAL LOW COMPLEX 20 MIN: CPT | Performed by: PHYSICAL THERAPIST

## 2024-09-25 NOTE — PROGRESS NOTES
Physical Therapy Initial Evaluation and Plan of Care      Patient: Zina Urban   : 1950  Diagnosis/ICD-10 Code:  Chronic left shoulder pain [M25.512, G89.29]  Referring practitioner: Sergey Green MD  Date of Initial Visit: 2024  Today's Date: 2024   Patient seen for 1 session  Location of Service: [place] [id number] [facility] - [address] [place] [address]       Visit Diagnoses:    ICD-10-CM ICD-9-CM   1. Chronic left shoulder pain  M25.512 719.41    G89.29 338.29   2. Radiculopathy, cervical region  M54.12 723.4   3. Pain of left upper extremity  M79.602 729.5   4. Weakness generalized  R53.1 780.79       Past Surgical History:   Procedure Laterality Date   • CLOSED REDUCTION AND PERCUTANEOUS PINNING OF HUMERUS FRACTURE      2023   • EYE SURGERY     • OOPHORECTOMY     • SUBTOTAL HYSTERECTOMY  1994    Energency surgery ovary      Patient Active Problem List    Diagnosis Date Noted   • Pain of right scapula 2024   • Closed fracture of right upper limb 2024   • Medicare annual wellness visit, subsequent 2023   • Mixed hyperlipidemia 2022     Note Last Updated: 2022     We will continue to monitor her cholesterol and recheck in 2023.     • Raynaud's disease without gangrene 2022   • Atrophic vaginitis 10/30/2015   • Lower urinary tract infectious disease 2015     Note Last Updated: 2022     Overview:   2015 IMO UPDATE    Formatting of this note might be different from the original.  2015 IMO UPDATE        Past Medical History:   Diagnosis Date   • Allergic     About 14   • Anemia    • Anxiety    • Cataract    • Glaucoma    • Headache     No longer   • Hyperlipidemia    • Hypertension     When ill   • Scoliosis    • Urinary tract infection    • Visual impairment     Far sighted      Subjective  Chief Complaint/Subjective Report: Patient presented to the clinic today with complaints of pain in the left  shoulder and neck that has been present chronically that started aorund a year ago after a fall, but recently worsening over the past 4-6 months. Pt reported a PMH of previous shoulder fracture years ago , see scanned and additional documents for further or additional PMH not mentioned at PT today. Pt made no reports of CNS signs or symptoms, or indications of other sinister pathologies were given in the subjective history today.   Mechanism of Injury: Fall   Functional Limitations: ADLs, work-related activities  Subjective Goals for PT: Return to PLOF, decreased pain with ADLs and community activities  Prior Treatment for Current Condition: MD, PT  Imaging:Yes  Pain/VNRS (0-10/10): Worst: 8/10; Average: 4/10  Agg. Factors: Sitting, reading, lifting, reaching  Relieving Factors: Heat, TENS  Subjective Questionnaire: QuickDASH: 63%  PLOF: Independent with all functional tasks (transfers, ambulation, stair navigation, squatting, lifting, etc.), ADLs/ iADLs (bathing, dressing, house work, and other such tasks), and community activities (driving, shopping, wellness activities, etc.)   Occupation:  Student at [employer]  PMH: See Subjective Report and scanned documentation   Precautions/Contraindications: No reported contraindications from subjective history today unless otherwise stated above.        Objective  AROM (% of Full --- * denotes degrees in place of % --- + denotes tested to be WFL)                                                         -- Cervical Rotation Right:      70        Left:     75                      -- Thoracic Rotation Right       -           Left      -                         -- Cervical Flexion:                  85                                              -- Cervical Extension:              70                                                                                                                                                                                    AROM Shoulder (% of Full  --- * denotes degrees in place of % --- + denotes tested to be WFL)                                        -- Shoulder Flexion Right:       85        Left:     80                      -- Shoulder Abduction Right:  80        Left:     75                                  -- Shoulder ER at Side Right: 85        Left:     85  -- Shoulder IR Behind Back:   85        Left:     80     UE MMT (0-5/5 --- + denotes WFL)  -- Shoulder Flexion Right:       4+/5 Left:     4-p/5  -- Shoulder Abduction Right:  4+/5 Left:     4p/5  -- Shoulder ER at Side Right: 4+/5 Left:     4p/5  -- Shoulder IR Behind Back:   -/5 Left:     -/5  -- Elbow Flexion Right:           4/5 Left:     4+/5  -- Elbow Extension Right        4+/5 Left:     5-/5  -- Wrist Flexion Right:             -/5 Left:     -/5  -- Wrist Extension Right:         -/5 Left:     -/5     +Quadrant on L        Functional Assessment: Impaired overhead reaching and lifting; restricted range and notable fatigue with >3 repetitions with 3lbs     Exercise and Interventions:  Access Code: [email or web address]  URL: [email or web address]  Date: 09/25/2024  Prepared by: Yordan Edwards    Exercises  - Seated Assisted Cervical Rotation with Towel  - 1 x daily - 7 x weekly - 3 sets - 10 reps  - Cervical Extension AROM with Strap  - 1 x daily - 7 x weekly - 3 sets - 10 reps  - Standing Shoulder Flexion AAROM with Dowel  - 1 x daily - 7 x weekly - 3 sets - 10 reps  - Radial nerve ball passes  - 1 x daily - 7 x weekly - 3 sets - 10 reps  - Thoracic Extension Mobilization on Foam Roll  - 1 x daily - 7 x weekly - 3 sets - 8 reps  - Seated Thoracic Extension Arms Overhead  - 1 x daily - 7 x weekly - 3 sets - 8 reps  - Bent Over Single Arm Shoulder Row with Dumbbell  - 1 x daily - 3-4 x weekly - 3 sets - 8 reps  - Face Pulls  - 1 x daily - 3-4 x weekly - 2-3 sets - 6-10 reps  - Half Kneel Row Y  - 1 x daily - 7 x weekly - 3 sets - 10 reps  - Rhythmic Stabilization Overhead Press  - 1 x daily - 7 x  weekly - 3 sets - 10 reps  - Squat Lift to Overhead Press (Clean and Press)  - 1 x daily - 7 x weekly - 3 sets - 10 reps  - Therapeutic Neuroscience Education - Education on Pain Neuroscience, graded motor exposure, pain threshold, alarm system and training levels, and progressive loading - 10 mins      Documentation of Assessment Details: Patient presented the clinic with signs and symptoms consistent with somatic dysfunction of the L shoulder with cervical radiating and referred pain. Patient demonstrated limitations and impairments mobility, strength, and activity tolerance during today's evaluation, and will benefit from skilled PT address current limitations and impairments to help patient regain functional mobility and strength necessary to return to PLOF, reduce pain, and improve current symptoms as patient progresses towards meeting current goals established at therapy today. The patient present with no comorbidities or personal factors that impact my POC and deficit in above mentioned areas. Based on these findings and results from valid tests and measures, I am classifying this patient's presentation as stable with uncomplicated characteristics, and a good prognosis for recovery.         Assessment & Plan       Assessment  Impairments: abnormal gait, abnormal or restricted ROM, activity intolerance, impaired physical strength, lacks appropriate home exercise program and pain with function   Functional limitations: carrying objects, lifting, sleeping, walking, uncomfortable because of pain, sitting and standing   Prognosis details: Based on valid tests and measures performed today I am classifying this patient as presently stable with uncomplicated characteristics and good prognosis for recovery    Goals  Plan Goals: SHORT TERM GOALS (0-4 Weeks):  Pt will demonstrated independence and compliance with HEP within 2 weeks to demonstrate understanding of interventions and treatments helpful in reaching STG and  LTG over the course of care.     Pt will improve Subjective assessment tool (NDI/QDASH) by >20% within 4 weeks to demonstrate improvements in test and measure outcomes and overall functionality, and to show reduction of symptoms, improved activity tolerance, and ability to complete ADLs and work-related activities     Pt will improve functional mobility and pain free ROM in the neck and shoulder to demonstrate improved functional capacity and independence with ADLs, driving and community participation activities.    Pt will reported pain <5/10 with ADLs and normal daily activities for >2 days/week over the past week to demonstrate functional improvements and activity tolerance and reduction in symptom severity     Pt will report a reduction of symptoms locations (I.e. head, interscapular, shoulder, arm, or neck regions) of 1 or more within 4 weeks to demonstrate a reduction in symptom irritability and severity and improved functional activity tolerance.       LONG TERM GOALS (6-8+ Weeks)   Pt will improve functional mobility and pain free ROM to ranges that allow for pain free functional activities such as driving, overhead reaching, dressing, bathing, and completing ADLs within 8 weeks to demonstrate improvements in functional independence, mobility, and community participation to allow for a return to PLOF.    Pt will demonstrate a 25% improvement in cervical and shoulder ROMs within 8 weeks to demonstrate improvements in functional mobility and ability to complete ADLs and work-related activities Independently.     Pt will reported pain <3/10 with ADLs and normal daily activities for >5 days/week over the past week to demonstrate functional improvements and activity tolerance and reduction in symptom severity     Pt will improve Subjective assessment tool (NDI/QDASH) by >50%  over baseline to demonstrate improvements in test and measure outcomes and overall functionality, and to show reduction of symptoms,  improved activity tolerance, and ability to complete ADLs and work-related activities     Pt will be able to lift and carry objects >30lbs without worsening of symptoms to demonstrate improvements in functional strength for ease of home and community tasks and improved functional independence.          Plan  Therapy options: will be seen for skilled therapy services  Planned modality interventions: dry needling, TENS, high voltage pulsed current (pain management), electrical stimulation/Russian stimulation and cryotherapy  Planned therapy interventions: ADL retraining, abdominal trunk stabilization, manual therapy, neuromuscular re-education, balance/weight-bearing training, body mechanics training, soft tissue mobilization, spinal/joint mobilization, joint mobilization, home exercise program, gait training, functional ROM exercises, strengthening, therapeutic activities and transfer training  Treatment plan discussed with: patient  Plan details: Duration: 2-3x/Wk for 4 Weeks - Upon completion of 4 weeks further evaluation and assessment will determine ongoing plan for continued care.    Continue with skilled physical therapy addressing previously mentioned limitations and impairments; progress HEP as tolerated; progress functional strengthening interventions to tolerance.    History # of Personal Factors and/or Comorbidities: LOW (0)  Examination of Body System(s): # of elements: LOW (1-2)  Clinical Presentation: STABLE   Clinical Decision Making: LOW       Timed:         Manual Therapy:         mins  94519;     Therapeutic Exercise:    15     mins  68568;     Neuromuscular Usman:    10    mins  35796;    Therapeutic Activity:          mins  13861;     Gait Training:           mins  02097;     Ultrasound:          mins  54790;    Ionto                                  mins   53294  Self Care                           mins   90194  Canalith Repos         mins 91062    Un-Timed:  Electrical Stimulation:          mins   53292 ( );  Dry Needling         mins self-pay  Traction         mins 08533  Low Eval    20     Mins  47390  Mod Eval         Mins  89741  High Eval                            Mins  55556    Timed Treatment:   25   mins   Total Treatment:     45   mins      PT: Yordan Edwards PT     License Number: [id number]  Electronically signed by Yordan Edwards PT, 09/25/24, 1:45 PM EDT    Certification Period: 9/25/2024 thru 12/23/2024  I certify that the therapy services are furnished while this patient is under my care.  The services outlined above are required by this patient, and will be reviewed every 90 days.         Physician Signature:__________________________________________________    PHYSICIAN: Sergey Green MD  NPI: [id number]                                      DATE:      Please sign and return via fax to [email or web address] . Thank you, [facility].

## 2024-09-27 ENCOUNTER — TELEPHONE (OUTPATIENT)
Dept: PHYSICAL THERAPY | Facility: CLINIC | Age: 74
End: 2024-09-27

## 2024-10-02 ENCOUNTER — TELEPHONE (OUTPATIENT)
Dept: PHYSICAL THERAPY | Facility: CLINIC | Age: 74
End: 2024-10-02

## 2024-10-02 NOTE — TELEPHONE ENCOUNTER
Caller: Zina Urban    Relationship: Self       What was the call regarding: ATE SOMETHING AND HAS GOTTEN ILL

## 2024-10-04 ENCOUNTER — TELEPHONE (OUTPATIENT)
Dept: PHYSICAL THERAPY | Facility: CLINIC | Age: 74
End: 2024-10-04

## 2024-10-09 ENCOUNTER — TREATMENT (OUTPATIENT)
Dept: PHYSICAL THERAPY | Facility: CLINIC | Age: 74
End: 2024-10-09
Payer: MEDICARE

## 2024-10-09 DIAGNOSIS — M25.512 CHRONIC LEFT SHOULDER PAIN: Primary | ICD-10-CM

## 2024-10-09 DIAGNOSIS — M79.602 PAIN OF LEFT UPPER EXTREMITY: ICD-10-CM

## 2024-10-09 DIAGNOSIS — M54.12 RADICULOPATHY, CERVICAL REGION: ICD-10-CM

## 2024-10-09 DIAGNOSIS — R53.1 WEAKNESS GENERALIZED: ICD-10-CM

## 2024-10-09 DIAGNOSIS — G89.29 CHRONIC LEFT SHOULDER PAIN: Primary | ICD-10-CM

## 2024-10-09 PROCEDURE — 97112 NEUROMUSCULAR REEDUCATION: CPT | Performed by: PHYSICAL THERAPIST

## 2024-10-09 PROCEDURE — 97530 THERAPEUTIC ACTIVITIES: CPT | Performed by: PHYSICAL THERAPIST

## 2024-10-09 PROCEDURE — 97110 THERAPEUTIC EXERCISES: CPT | Performed by: PHYSICAL THERAPIST

## 2024-10-15 ENCOUNTER — TREATMENT (OUTPATIENT)
Dept: PHYSICAL THERAPY | Facility: CLINIC | Age: 74
End: 2024-10-15
Payer: MEDICARE

## 2024-10-15 DIAGNOSIS — M25.512 CHRONIC LEFT SHOULDER PAIN: Primary | ICD-10-CM

## 2024-10-15 DIAGNOSIS — M54.12 RADICULOPATHY, CERVICAL REGION: ICD-10-CM

## 2024-10-15 DIAGNOSIS — G89.29 CHRONIC LEFT SHOULDER PAIN: Primary | ICD-10-CM

## 2024-10-15 DIAGNOSIS — M79.602 PAIN OF LEFT UPPER EXTREMITY: ICD-10-CM

## 2024-10-15 DIAGNOSIS — R53.1 WEAKNESS GENERALIZED: ICD-10-CM

## 2024-10-15 PROCEDURE — 97112 NEUROMUSCULAR REEDUCATION: CPT | Performed by: PHYSICAL THERAPIST

## 2024-10-15 PROCEDURE — 97530 THERAPEUTIC ACTIVITIES: CPT | Performed by: PHYSICAL THERAPIST

## 2024-10-15 PROCEDURE — 97110 THERAPEUTIC EXERCISES: CPT | Performed by: PHYSICAL THERAPIST

## 2024-10-17 NOTE — PROGRESS NOTES
Physical Therapy Daily Note    Patient: Zina Urban   : 1950  Diagnosis/ICD-10 Code:  Chronic left shoulder pain [M25.512, G89.29]  Referring practitioner: Sergey Green MD  Date of Initial Visit: Type: THERAPY  Noted: 2024  Today's Date: 10/15/2024  Patient seen for 3 session  Location of Service: [place] [id number] [facility] - [address] [place] [address]       Visit Diagnoses:    ICD-10-CM ICD-9-CM   1. Chronic left shoulder pain  M25.512 719.41    G89.29 338.29   2. Radiculopathy, cervical region  M54.12 723.4   3. Pain of left upper extremity  M79.602 729.5   4. Weakness generalized  R53.1 780.79            Subjective  Zina Urban reported today that she feels like things are improving.     Objective   No functional measures updated at today's visit unless otherwise stated. For functional assessment and documentation of patient progressions referred to the assessment section.    See Exercise, Manual, and Modality Logs for complete treatments.       Assessment/Plan  Treatment today continued with emphasis of end range mobility interventions and functional strengthening to promoted improved functional mobility. Added emphasis today on overhead strengthening and AAROM mobility. Pt continues to have limitations in functional strength and mobility, and will continue to benefit from ongoing skilled PT to help pt continue to progress towards current LTGs and return to PLOF.    Plan: Continue with current treatment plan and progressions to reach goals established and previous evaluation/assessment         Timed:         Manual Therapy:         mins  55818;     Therapeutic Exercise:    30     mins  95435;     Neuromuscular Usman:    15    mins  54107;    Therapeutic Activity:     10     mins  00737;     Gait Training:           mins  57829;     Ultrasound:          mins  42406;    Ionto                                   mins   94896  Self Care                             mins   87782  Canalith Repos         mins 26480    Un-Timed:  Electrical Stimulation:         mins  46439 ( );  Dry Needling          mins self-pay  Traction          mins 64707  Low Eval          Mins  78453  Mod Eval          Mins  29672  High Eval                            Mins  45019      Timed Treatment:   55   mins   Total Treatment:     55   mins      PT: Yordan Edwards PT     License Number: [id number]  Electronically signed by Yordan Edwards PT, 10/17/24, 12:40 PM EDT

## 2024-10-18 ENCOUNTER — TREATMENT (OUTPATIENT)
Dept: PHYSICAL THERAPY | Facility: CLINIC | Age: 74
End: 2024-10-18
Payer: MEDICARE

## 2024-10-18 DIAGNOSIS — M79.602 PAIN OF LEFT UPPER EXTREMITY: ICD-10-CM

## 2024-10-18 DIAGNOSIS — R53.1 WEAKNESS GENERALIZED: ICD-10-CM

## 2024-10-18 DIAGNOSIS — M54.12 RADICULOPATHY, CERVICAL REGION: ICD-10-CM

## 2024-10-18 DIAGNOSIS — G89.29 CHRONIC LEFT SHOULDER PAIN: Primary | ICD-10-CM

## 2024-10-18 DIAGNOSIS — M25.512 CHRONIC LEFT SHOULDER PAIN: Primary | ICD-10-CM

## 2024-10-18 PROCEDURE — 97112 NEUROMUSCULAR REEDUCATION: CPT | Performed by: PHYSICAL THERAPIST

## 2024-10-18 PROCEDURE — 97530 THERAPEUTIC ACTIVITIES: CPT | Performed by: PHYSICAL THERAPIST

## 2024-10-18 PROCEDURE — 97110 THERAPEUTIC EXERCISES: CPT | Performed by: PHYSICAL THERAPIST

## 2024-10-22 NOTE — PROGRESS NOTES
Physical Therapy Daily Note    Patient: Zina Urban   : 1950  Diagnosis/ICD-10 Code:  Chronic left shoulder pain [M25.512, G89.29]  Referring practitioner: Sergey Green MD  Date of Initial Visit: Type: THERAPY  Noted: 2024  Today's Date: 10/18/2024  Patient seen for 4 session  Location of Service: [place] [id number] [facility] - [address] [place] [address]       Visit Diagnoses:    ICD-10-CM ICD-9-CM   1. Chronic left shoulder pain  M25.512 719.41    G89.29 338.29   2. Radiculopathy, cervical region  M54.12 723.4   3. Pain of left upper extremity  M79.602 729.5   4. Weakness generalized  R53.1 780.79            Subjective  Zina Urban reported today that she feels like things are improving. Still having a number of issues, but feels like it's going in the right direction    Objective   No functional measures updated at today's visit unless otherwise stated. For functional assessment and documentation of patient progressions referred to the assessment section.    See Exercise, Manual, and Modality Logs for complete treatments.       Assessment/Plan  Treatment today continued with emphasis of end range mobility interventions and functional strengthening to promoted improved functional mobility and independence with ADLs and community activities. Pt continues to report difficulty and limitations with shoulder mobility, though tolerance to interventions appears to be improving per gross assessment and patient reports in the clinic. Pt continues to have limitations in functional strength and mobility, and will continue to benefit from ongoing skilled PT to help pt continue to progress towards current LTGs and return to PLOF.    Plan: Continue with current treatment plan and progressions to reach goals established and previous evaluation/assessment         Timed:         Manual Therapy:         mins  64046;     Therapeutic Exercise:    30     mins  22113;      Neuromuscular Usman:    15    mins  51565;    Therapeutic Activity:     10     mins  62231;     Gait Training:           mins  22967;     Ultrasound:          mins  26774;    Ionto                                   mins   46536  Self Care                            mins   60269  Canalith Repos         mins 88423    Un-Timed:  Electrical Stimulation:         mins  00955 ( );  Dry Needling          mins self-pay  Traction          mins 25833  Low Eval          Mins  57858  Mod Eval          Mins  04508  High Eval                            Mins  35642      Timed Treatment:   55   mins   Total Treatment:     55   mins      PT: Yordan Edwards PT     License Number: [id number]  Electronically signed by Yordan Edwards PT, 10/22/24, 10:03 AM EDT

## 2024-10-23 ENCOUNTER — TREATMENT (OUTPATIENT)
Dept: PHYSICAL THERAPY | Facility: CLINIC | Age: 74
End: 2024-10-23
Payer: MEDICARE

## 2024-10-23 DIAGNOSIS — M25.512 CHRONIC LEFT SHOULDER PAIN: Primary | ICD-10-CM

## 2024-10-23 DIAGNOSIS — M54.12 RADICULOPATHY, CERVICAL REGION: ICD-10-CM

## 2024-10-23 DIAGNOSIS — R53.1 WEAKNESS GENERALIZED: ICD-10-CM

## 2024-10-23 DIAGNOSIS — G89.29 CHRONIC LEFT SHOULDER PAIN: Primary | ICD-10-CM

## 2024-10-23 DIAGNOSIS — M79.602 PAIN OF LEFT UPPER EXTREMITY: ICD-10-CM

## 2024-10-23 PROCEDURE — 97530 THERAPEUTIC ACTIVITIES: CPT | Performed by: PHYSICAL THERAPIST

## 2024-10-23 PROCEDURE — 97110 THERAPEUTIC EXERCISES: CPT | Performed by: PHYSICAL THERAPIST

## 2024-10-23 PROCEDURE — 97112 NEUROMUSCULAR REEDUCATION: CPT | Performed by: PHYSICAL THERAPIST

## 2024-10-25 ENCOUNTER — TREATMENT (OUTPATIENT)
Dept: PHYSICAL THERAPY | Facility: CLINIC | Age: 74
End: 2024-10-25
Payer: MEDICARE

## 2024-10-25 DIAGNOSIS — M79.602 PAIN OF LEFT UPPER EXTREMITY: ICD-10-CM

## 2024-10-25 DIAGNOSIS — R53.1 WEAKNESS GENERALIZED: ICD-10-CM

## 2024-10-25 DIAGNOSIS — G89.29 CHRONIC LEFT SHOULDER PAIN: Primary | ICD-10-CM

## 2024-10-25 DIAGNOSIS — M54.12 RADICULOPATHY, CERVICAL REGION: ICD-10-CM

## 2024-10-25 DIAGNOSIS — M25.512 CHRONIC LEFT SHOULDER PAIN: Primary | ICD-10-CM

## 2024-10-25 PROCEDURE — 97530 THERAPEUTIC ACTIVITIES: CPT | Performed by: PHYSICAL THERAPIST

## 2024-10-25 PROCEDURE — 97112 NEUROMUSCULAR REEDUCATION: CPT | Performed by: PHYSICAL THERAPIST

## 2024-10-25 PROCEDURE — 97110 THERAPEUTIC EXERCISES: CPT | Performed by: PHYSICAL THERAPIST

## 2024-10-25 NOTE — PROGRESS NOTES
Re-Assessment / Re-Certification    Patient: Zina Urban   : 1950  Diagnosis/ICD-10 Code:  Chronic left shoulder pain [M25.512, G89.29]  Referring practitioner: Sergey Green MD  Date of Initial Visit: Type: THERAPY  Noted: 2024  Today's Date: 10/25/2024   Patient seen for 6 session  Location of Service: [address] - [address] [place] [address]       Visit Diagnoses:    ICD-10-CM ICD-9-CM   1. Chronic left shoulder pain  M25.512 719.41    G89.29 338.29   2. Radiculopathy, cervical region  M54.12 723.4   3. Pain of left upper extremity  M79.602 729.5   4. Weakness generalized  R53.1 780.79       Subjective:     Subjective Questionnaire: QuickDASH: 50%  Clinical Progress: improved  Home Program Compliance: Yes  Treatment has included: therapeutic exercise, neuromuscular re-education, manual therapy, and therapeutic activity  NPRS: Worst: 4/10, Average: 2/10    Subjective   Zina Urban reported today that she's doing better, shoulders still feel pretty stiff though     Objective   AROM (% of Full --- * denotes degrees in place of % --- + denotes tested to be WFL)                                                         -- Cervical Rotation Right:      70        Left:     75                      -- Thoracic Rotation Right       -           Left      -                         -- Cervical Flexion:                  85                                              -- Cervical Extension:              70                                                                                                                                                                                    AROM Shoulder (% of Full --- * denotes degrees in place of % --- + denotes tested to be WFL)                                        -- Shoulder Flexion Right:       85        Left:     85                      -- Shoulder Abduction Right:  80        Left:     80                                   -- Shoulder ER at Side Right: 85        Left:     85  -- Shoulder IR Behind Back:   85        Left:     85     UE MMT (0-5/5 --- + denotes WFL)  -- Shoulder Flexion Right:       4+/5    Left:     4/5  -- Shoulder Abduction Right:  4+/5     Left:     4/5  -- Shoulder ER at Side Right: 4+/5     Left:     4/5  -- Shoulder IR Behind Back:   -/5        Left:     -/5  -- Elbow Flexion Right:           4/5       Left:     4+/5  -- Elbow Extension Right        4+/5     Left:     5-/5  -- Wrist Flexion Right:             -/5        Left:     -/5  -- Wrist Extension Right:         -/5       Left:     -/5    See Exercise, Manual, and Modality Logs for complete treatment.     Assessment/Plan  Patient demonstrated improvements in mobility and strength at today's re-evaluation and is progressing well towards current goals under current treatment plan. Tx today continue with emphasis of progressive functional strengthening, which the patient tolerated well and without complaint. VC and tactile cueing were provided with all interventions as needed during today's session. Patient's HEP was updated and progressed at the end of today's treatment with patient demonstrating good understanding Patient continues to have some limitations and impairments in the aforementioned areas and will continue to benefit from skilled PT to help patient regain functional mobility and return to PLOF by meeting all LTGs.      Progress toward previous goals: Partially Met    -    New Goals  Short-term goals (STG): -  Long-term goals (LTG): -      Recommendations: Continue as planned following plan implemented at IE, with progressions towards goals est at IE  Timeframe: 1 month  Prognosis to achieve goals: good    PT: Yordan Edwards PT     License Number: [id number]  Electronically signed by Yordan Edwards PT, 10/25/24, 4:00 PM EDT      Based upon review of the patient's progress and continued therapy plan, it is my medical opinion that Zina  Wilmar should continue physical therapy treatment at [facility]  [facility]  [address] [facility], [address] [address] [phone].    Signature: __________________________________  Sergey Green MD  PHYSICIAN: Sergey Green MD  NPI: [id number]                                     DATE:     Timed:         Manual Therapy:    -     mins  25307;     Therapeutic Exercise:    25     mins  49231;     Neuromuscular Usman:    10    mins  28495;    Therapeutic Activity:     10     mins  62142;     Gait Training:           mins  04378;     Ultrasound:          mins  72468;    Ionto                                  mins   54136  Self Care                           mins   98379  Canalith Repos         mins 95011    Un-Timed:  Electrical Stimulation:          mins  31031 ( );  Dry Needling         mins self-pay  Traction         mins 28161  Re-Evaluation    -    Mins  49358      Timed Treatment:   45   mins   Total Treatment:     45   mins      Please sign and return via fax to [email or web address] . Thank you, [facility].

## 2024-10-25 NOTE — PROGRESS NOTES
Physical Therapy Daily Note    Patient: Zina Urban   : 1950  Diagnosis/ICD-10 Code:  Chronic left shoulder pain [M25.512, G89.29]  Referring practitioner: Sergey Green MD  Date of Initial Visit: Type: THERAPY  Noted: 2024  Today's Date: 10/23/2024  Patient seen for 5 session  Location of Service: [place] [id number] [facility] - [address] [place] [address]       Visit Diagnoses:    ICD-10-CM ICD-9-CM   1. Chronic left shoulder pain  M25.512 719.41    G89.29 338.29   2. Radiculopathy, cervical region  M54.12 723.4   3. Pain of left upper extremity  M79.602 729.5   4. Weakness generalized  R53.1 780.79            Subjective  Zina Urban reported today that she is doing well, sore for a bit after last session, but feels like things are improving    Objective   No functional measures updated at today's visit unless otherwise stated. For functional assessment and documentation of patient progressions referred to the assessment section.    See Exercise, Manual, and Modality Logs for complete treatments.       Assessment/Plan  Treatment today continued with progressive strengthening and dynamic stability. Pt continues to have difficulty with overhead reaching, but has demonstrated moderate improvements thus far with PT. Pt continues to have limitations in functional strength and mobility, and will continue to benefit from ongoing skilled PT to help pt continue to progress towards current LTGs and return to PLOF.    Plan: Continue with current treatment plan and progressions to reach goals established and previous evaluation/assessment         Timed:         Manual Therapy:         mins  48313;     Therapeutic Exercise:    20     mins  68236;     Neuromuscular Usman:    10    mins  09621;    Therapeutic Activity:     10     mins  67848;     Gait Training:           mins  88064;     Ultrasound:          mins  71983;    Ionto                                   mins    59088  Self Care                            mins   73555  Canalith Repos         mins 22872    Un-Timed:  Electrical Stimulation:         mins  50149 ( );  Dry Needling          mins self-pay  Traction          mins 92568  Low Eval          Mins  66072  Mod Eval          Mins  77407  High Eval                            Mins  41813      Timed Treatment:   40   mins   Total Treatment:     40   mins      PT: Yordan Edwards PT     License Number: [id number]  Electronically signed by Yordan Edwards PT, 10/25/24, 1:03 PM EDT

## 2024-10-30 ENCOUNTER — OFFICE VISIT (OUTPATIENT)
Dept: ORTHOPEDIC SURGERY | Facility: CLINIC | Age: 74
End: 2024-10-30
Payer: MEDICARE

## 2024-10-30 VITALS — BODY MASS INDEX: 21.75 KG/M2 | HEIGHT: 64 IN | TEMPERATURE: 98 F | WEIGHT: 127.4 LBS

## 2024-10-30 DIAGNOSIS — M75.02 ADHESIVE CAPSULITIS OF LEFT SHOULDER: ICD-10-CM

## 2024-10-30 DIAGNOSIS — G89.29 CHRONIC RIGHT SHOULDER PAIN: ICD-10-CM

## 2024-10-30 DIAGNOSIS — M25.511 CHRONIC RIGHT SHOULDER PAIN: ICD-10-CM

## 2024-10-30 DIAGNOSIS — M25.512 LEFT SHOULDER PAIN, UNSPECIFIED CHRONICITY: Primary | ICD-10-CM

## 2024-10-30 PROCEDURE — 1160F RVW MEDS BY RX/DR IN RCRD: CPT | Performed by: ORTHOPAEDIC SURGERY

## 2024-10-30 PROCEDURE — 99213 OFFICE O/P EST LOW 20 MIN: CPT | Performed by: ORTHOPAEDIC SURGERY

## 2024-10-30 PROCEDURE — 1159F MED LIST DOCD IN RCRD: CPT | Performed by: ORTHOPAEDIC SURGERY

## 2024-10-30 NOTE — PROGRESS NOTES
CC:  Follow up left shoulder pain    Ms. Urban follows up for her left shoulder.  She has been going to PT as recommended.  She says it seems to have helped tremendously.  She says that her motion is much better.  She is not quite back to 100% but she says there has been tremendous improvement.  At this point, she says that the left shoulder is actually moving better than the right.  She fears that she may have a similar problem on the right side as well.  She has also been experiencing intermittent stabbing pains in the back of the right shoulder.  She localizes this to the scapula.  She cannot recall any specific inciting event or factor.    Left shoulder: Skin is benign.  No tenderness.  Her motion is tremendously improved.  She is nearly full in all planes.  Really her only limitation is internal rotation where she is lacking about 2 levels compared to the other side.    Right shoulder: Skin is benign.  She has focal tenderness at a spot just above the inferior border of the scapula.  It seems to be a trigger point in the muscle.    Assessment: 1.  Left shoulder adhesive capsulitis 2.  Right shoulder trigger point    Plan: The therapy seems to have helped her shoulder tremendously.  I encouraged her to keep working on the exercises.  I do think she could benefit from more PT, particularly for the right shoulder.  I suggested that dry needling and massage might be beneficial.  She was interested in both so I gave her a referral to hopefully have the therapist do this for her.  I would like to see her back in 6 weeks for a final visit.    Sergey Green MD

## 2024-10-31 ENCOUNTER — TELEPHONE (OUTPATIENT)
Dept: PHYSICAL THERAPY | Facility: OTHER | Age: 74
End: 2024-10-31
Payer: MEDICARE

## 2024-11-05 ENCOUNTER — TREATMENT (OUTPATIENT)
Dept: PHYSICAL THERAPY | Facility: CLINIC | Age: 74
End: 2024-11-05
Payer: MEDICARE

## 2024-11-05 DIAGNOSIS — M79.602 PAIN OF LEFT UPPER EXTREMITY: ICD-10-CM

## 2024-11-05 DIAGNOSIS — R53.1 WEAKNESS GENERALIZED: ICD-10-CM

## 2024-11-05 DIAGNOSIS — G89.29 CHRONIC LEFT SHOULDER PAIN: Primary | ICD-10-CM

## 2024-11-05 DIAGNOSIS — M54.12 RADICULOPATHY, CERVICAL REGION: ICD-10-CM

## 2024-11-05 DIAGNOSIS — M25.512 CHRONIC LEFT SHOULDER PAIN: Primary | ICD-10-CM

## 2024-11-05 PROCEDURE — 97110 THERAPEUTIC EXERCISES: CPT | Performed by: PHYSICAL THERAPIST

## 2024-11-05 PROCEDURE — 97112 NEUROMUSCULAR REEDUCATION: CPT | Performed by: PHYSICAL THERAPIST

## 2024-11-05 PROCEDURE — 97530 THERAPEUTIC ACTIVITIES: CPT | Performed by: PHYSICAL THERAPIST

## 2024-11-07 ENCOUNTER — TREATMENT (OUTPATIENT)
Dept: PHYSICAL THERAPY | Facility: CLINIC | Age: 74
End: 2024-11-07
Payer: MEDICARE

## 2024-11-07 DIAGNOSIS — M54.12 RADICULOPATHY, CERVICAL REGION: ICD-10-CM

## 2024-11-07 DIAGNOSIS — M25.512 CHRONIC LEFT SHOULDER PAIN: Primary | ICD-10-CM

## 2024-11-07 DIAGNOSIS — R53.1 WEAKNESS GENERALIZED: ICD-10-CM

## 2024-11-07 DIAGNOSIS — G89.29 CHRONIC LEFT SHOULDER PAIN: Primary | ICD-10-CM

## 2024-11-07 DIAGNOSIS — M79.602 PAIN OF LEFT UPPER EXTREMITY: ICD-10-CM

## 2024-11-07 PROCEDURE — 97112 NEUROMUSCULAR REEDUCATION: CPT | Performed by: PHYSICAL THERAPIST

## 2024-11-07 PROCEDURE — 97110 THERAPEUTIC EXERCISES: CPT | Performed by: PHYSICAL THERAPIST

## 2024-11-07 PROCEDURE — 97530 THERAPEUTIC ACTIVITIES: CPT | Performed by: PHYSICAL THERAPIST

## 2024-11-08 NOTE — PROGRESS NOTES
Physical Therapy Daily Note    Patient: Zina Urban   : 1950  Diagnosis/ICD-10 Code:  Chronic left shoulder pain [M25.512, G89.29]  Referring practitioner: Sergey Green MD  Date of Initial Visit: Type: THERAPY  Noted: 2024  Today's Date: 2024   Patient seen for 7 session  Location of Service: [place] [id number] [facility] - [address] [place] [address]       Visit Diagnoses:    ICD-10-CM ICD-9-CM   1. Chronic left shoulder pain  M25.512 719.41    G89.29 338.29   2. Radiculopathy, cervical region  M54.12 723.4   3. Pain of left upper extremity  M79.602 729.5   4. Weakness generalized  R53.1 780.79            Subjective  Zina Urban reported today that she's doing better    Objective   No functional measures updated at today's visit unless otherwise stated. For functional assessment and documentation of patient progressions referred to the assessment section.    See Exercise, Manual, and Modality Logs for complete treatments.       Assessment/Plan  Pt's limitations and impairments were addressed in treatment today with shoulder mobility and overhead strengthening, which pt tolerated well and without complaint. Pt continues to have difficulty with shoulder mobility and UE strength which was address in treatment today with overhead interventions. Pt continues to have limitations in the aforementioned areas, and will continue to benefit from ongoing skilled PT to help pt regain functional mobility and strength to meet LTG and return to PLOF.      Continue with current treatment plan and ongoing assessment; progress interventions to tolerance         Timed:         Manual Therapy:    -     mins  01679;     Therapeutic Exercise:    20     mins  75542;     Neuromuscular Usman:    10    mins  83796;    Therapeutic Activity:     10     mins  76604;     Gait Training:           mins  37003;     Ultrasound:          mins  61819;    Ionto                                    mins   75667  Self Care                            mins   91026  Canalith Repos         mins 50048    Un-Timed:  Electrical Stimulation:         mins  08364 ( );  Dry Needling     -     mins self-pay  Traction          mins 64723  Low Eval          Mins  59150  Mod Eval          Mins  61548  High Eval                            Mins  15817      Timed Treatment:   40   mins   Total Treatment:     40   mins      PT: Yordan Edwards PT     License Number: [id number]  Electronically signed by Yordan Edwards PT, 11/08/24, 2:28 PM EST

## 2024-11-13 ENCOUNTER — TREATMENT (OUTPATIENT)
Dept: PHYSICAL THERAPY | Facility: CLINIC | Age: 74
End: 2024-11-13
Payer: MEDICARE

## 2024-11-13 DIAGNOSIS — M79.602 PAIN OF LEFT UPPER EXTREMITY: ICD-10-CM

## 2024-11-13 DIAGNOSIS — M25.512 CHRONIC LEFT SHOULDER PAIN: Primary | ICD-10-CM

## 2024-11-13 DIAGNOSIS — G89.29 CHRONIC LEFT SHOULDER PAIN: Primary | ICD-10-CM

## 2024-11-13 DIAGNOSIS — R53.1 WEAKNESS GENERALIZED: ICD-10-CM

## 2024-11-13 DIAGNOSIS — M54.12 RADICULOPATHY, CERVICAL REGION: ICD-10-CM

## 2024-11-13 PROCEDURE — 97530 THERAPEUTIC ACTIVITIES: CPT | Performed by: PHYSICAL THERAPIST

## 2024-11-13 PROCEDURE — 97110 THERAPEUTIC EXERCISES: CPT | Performed by: PHYSICAL THERAPIST

## 2024-11-13 PROCEDURE — 97112 NEUROMUSCULAR REEDUCATION: CPT | Performed by: PHYSICAL THERAPIST

## 2024-11-13 NOTE — PROGRESS NOTES
Physical Therapy Daily Note    Patient: Zina Urban   : 1950  Diagnosis/ICD-10 Code:  Chronic left shoulder pain [M25.512, G89.29]  Referring practitioner: Sergey Green MD  Date of Initial Visit: Type: THERAPY  Noted: 2024  Today's Date: 2024  Patient seen for 8 session  Location of Service: [place] [id number] [facility] - [address] [place] [address]       Visit Diagnoses:    ICD-10-CM ICD-9-CM   1. Chronic left shoulder pain  M25.512 719.41    G89.29 338.29   2. Radiculopathy, cervical region  M54.12 723.4   3. Pain of left upper extremity  M79.602 729.5   4. Weakness generalized  R53.1 780.79            Subjective  Zina Urban reported today that she is doing better, feels like things are improving    Objective   No functional measures updated at today's visit unless otherwise stated. For functional assessment and documentation of patient progressions referred to the assessment section.    See Exercise, Manual, and Modality Logs for complete treatments.       Assessment/Plan  Started treatment today with dynamic warm-up to prepare pt for interventions; tx today continued with end range mobility, neurodynamic mobility, and functional strengthening, which pt tolerated well and without complaint. Pt continues to have complaints in the aforementioned area, and continues to display limitations and impairments previously noted; pt will continue to benefit from ongoing skilled PT to help pt continue to progress towards current LTGs and return to PLOF.    Plan: Continue with current treatment plan and progressions to reach goals established and previous evaluation/assessment         Timed:         Manual Therapy:         mins  34132;     Therapeutic Exercise:    20     mins  29604;     Neuromuscular Usman:    10    mins  91154;    Therapeutic Activity:     10     mins  69385;     Gait Training:           mins  30184;     Ultrasound:          mins  18073;     Ionto                                   mins   68294  Self Care                            mins   28075  Canalith Repos         mins 59857    Un-Timed:  Electrical Stimulation:         mins  03086 ( );  Dry Needling          mins self-pay  Traction          mins 12453  Low Eval          Mins  48605  Mod Eval          Mins  14661  High Eval                            Mins  15203      Timed Treatment:   40   mins   Total Treatment:     40   mins      PT: Yordan Edwards PT     License Number: [id number]  Electronically signed by Yordan Edwards PT, 11/13/24, 10:11 AM EST

## 2024-11-15 ENCOUNTER — TREATMENT (OUTPATIENT)
Dept: PHYSICAL THERAPY | Facility: CLINIC | Age: 74
End: 2024-11-15
Payer: MEDICARE

## 2024-11-15 DIAGNOSIS — R53.1 WEAKNESS GENERALIZED: ICD-10-CM

## 2024-11-15 DIAGNOSIS — M79.602 PAIN OF LEFT UPPER EXTREMITY: ICD-10-CM

## 2024-11-15 DIAGNOSIS — M54.12 RADICULOPATHY, CERVICAL REGION: ICD-10-CM

## 2024-11-15 DIAGNOSIS — G89.29 CHRONIC LEFT SHOULDER PAIN: Primary | ICD-10-CM

## 2024-11-15 DIAGNOSIS — M25.512 CHRONIC LEFT SHOULDER PAIN: Primary | ICD-10-CM

## 2024-11-15 PROCEDURE — 97110 THERAPEUTIC EXERCISES: CPT | Performed by: PHYSICAL THERAPIST

## 2024-11-15 PROCEDURE — 97530 THERAPEUTIC ACTIVITIES: CPT | Performed by: PHYSICAL THERAPIST

## 2024-11-15 PROCEDURE — 97112 NEUROMUSCULAR REEDUCATION: CPT | Performed by: PHYSICAL THERAPIST

## 2024-11-19 NOTE — PROGRESS NOTES
Physical Therapy Daily Note    Patient: Zina Urban   : 1950  Diagnosis/ICD-10 Code:  Chronic left shoulder pain [M25.512, G89.29]  Referring practitioner: Sergey Green MD  Date of Initial Visit: Type: THERAPY  Noted: 2024  Today's Date: 2024   Patient seen for 9 session  Location of Service: [place] [id number] [facility] - [address] [place] [address]       Visit Diagnoses:    ICD-10-CM ICD-9-CM   1. Chronic left shoulder pain  M25.512 719.41    G89.29 338.29   2. Radiculopathy, cervical region  M54.12 723.4   3. Pain of left upper extremity  M79.602 729.5   4. Weakness generalized  R53.1 780.79            Subjective  Zina Urban reported today that she's doing better, feels like the shoulders are getting a little looser, still feels weak and still have difficulty with overhead reaching    Objective   No functional measures updated at today's visit unless otherwise stated. For functional assessment and documentation of patient progressions referred to the assessment section.    See Exercise, Manual, and Modality Logs for complete treatments.       Assessment/Plan  Tx today emphasized end range shoulder mobility and overhead strengthening of the UEs to address current limitations and impairments in shoulder mobility and UE function. Pt appears to be progressing well with current treatment plan and tolerated today's treatment well. Pt continues to have limitations in the above mentioned areas and will continue to benefit from skilled PT to help pt regain functional mobility and strength necessary to reduce symptoms and return to PLOF.      Continue with current treatment plan and ongoing assessment; progress interventions to tolerance         Timed:         Manual Therapy:    -     mins  14506;     Therapeutic Exercise:    20     mins  23968;     Neuromuscular Usman:    10    mins  53193;    Therapeutic Activity:     10     mins  35071;     Gait Training:            mins  81511;     Ultrasound:          mins  65029;    Ionto                                   mins   33043  Self Care                            mins   97738  Canalith Repos         mins 92744    Un-Timed:  Electrical Stimulation:         mins  33715 ( );  Dry Needling     -     mins self-pay  Traction          mins 84148  Low Eval          Mins  91699  Mod Eval          Mins  80066  High Eval                            Mins  89576      Timed Treatment:   40   mins   Total Treatment:     40   mins      PT: Yordan Edwards PT     License Number: [id number]  Electronically signed by Yordan Edwards, PT, 11/19/24, 1:21 PM EST

## 2024-11-20 ENCOUNTER — TREATMENT (OUTPATIENT)
Dept: PHYSICAL THERAPY | Facility: CLINIC | Age: 74
End: 2024-11-20
Payer: MEDICARE

## 2024-11-20 DIAGNOSIS — G89.29 CHRONIC LEFT SHOULDER PAIN: Primary | ICD-10-CM

## 2024-11-20 DIAGNOSIS — M79.602 PAIN OF LEFT UPPER EXTREMITY: ICD-10-CM

## 2024-11-20 DIAGNOSIS — M25.512 CHRONIC LEFT SHOULDER PAIN: Primary | ICD-10-CM

## 2024-11-20 DIAGNOSIS — R53.1 WEAKNESS GENERALIZED: ICD-10-CM

## 2024-11-20 DIAGNOSIS — M54.12 RADICULOPATHY, CERVICAL REGION: ICD-10-CM

## 2024-11-20 PROCEDURE — 97110 THERAPEUTIC EXERCISES: CPT | Performed by: PHYSICAL THERAPIST

## 2024-11-20 PROCEDURE — 97530 THERAPEUTIC ACTIVITIES: CPT | Performed by: PHYSICAL THERAPIST

## 2024-11-20 PROCEDURE — 97112 NEUROMUSCULAR REEDUCATION: CPT | Performed by: PHYSICAL THERAPIST

## 2024-11-22 NOTE — PROGRESS NOTES
Physical Therapy Daily Note    Patient: Zina Urban   : 1950  Diagnosis/ICD-10 Code:  Chronic left shoulder pain [M25.512, G89.29]  Referring practitioner: Sergey Green MD  Date of Initial Visit: Type: THERAPY  Noted: 2024  Today's Date: 11/15/2024   Patient seen for 10 session  Location of Service: [place] [id number] [facility] - [address] [place] [address]       Visit Diagnoses:    ICD-10-CM ICD-9-CM   1. Chronic left shoulder pain  M25.512 719.41    G89.29 338.29   2. Radiculopathy, cervical region  M54.12 723.4   3. Pain of left upper extremity  M79.602 729.5   4. Weakness generalized  R53.1 780.79            Subjective  Zina Urban reported today that she's doing well, shoulder    Objective   No functional measures updated at today's visit unless otherwise stated. For functional assessment and documentation of patient progressions referred to the assessment section.    See Exercise, Manual, and Modality Logs for complete treatments.       Assessment/Plan  Treatment today consisted of end range shoulder mobility interventions along with progressive shoulder strengthening to address patients ongoing limitations and impairments in UE function. Patient tolerated today's treatment well and without complaint or adverse event. Patient continues to have previous noted areas and will continue to benefit from ongoing skilled physical therapy to reach current goals and return to PLOF.      Continue with current treatment plan and ongoing assessment; progress interventions to tolerance         Timed:         Manual Therapy:    -     mins  33882;     Therapeutic Exercise:    20     mins  86879;     Neuromuscular Usman:    10    mins  72995;    Therapeutic Activity:     10     mins  99575;     Gait Training:           mins  65133;     Ultrasound:          mins  72968;    Ionto                                   mins   88334  Self Care                            mins    77960  [facility]         mins 03984    Un-Timed:  Electrical Stimulation:         mins  13550 ( );  Dry Needling     -     mins self-pay  Traction          mins 87007  Low Eval          Mins  10921  Mod Eval          Mins  04936  High Eval                            Mins  65961      Timed Treatment:   40   mins   Total Treatment:     40   mins      PT: Yordan Edwards PT     License Number: [id number]  Electronically signed by Yordan Edwards PT, 11/22/24, 6:46 AM EST

## 2024-11-26 NOTE — PROGRESS NOTES
Physical Therapy Daily Note    Patient: Zina Urban   : 1950  Diagnosis/ICD-10 Code:  Chronic left shoulder pain [M25.512, G89.29]  Referring practitioner: Sergey Green MD  Date of Initial Visit: Type: THERAPY  Noted: 2024  Today's Date: 2024  Patient seen for 11 session  Location of Service: [place] [id number] [facility] - [address] [place] [address]       Visit Diagnoses:    ICD-10-CM ICD-9-CM   1. Chronic left shoulder pain  M25.512 719.41    G89.29 338.29   2. Radiculopathy, cervical region  M54.12 723.4   3. Pain of left upper extremity  M79.602 729.5   4. Weakness generalized  R53.1 780.79            Subjective  Zina Urban reported today that she is doing better, feels like things are improving    Objective   No functional measures updated at today's visit unless otherwise stated. For functional assessment and documentation of patient progressions referred to the assessment section.    See Exercise, Manual, and Modality Logs for complete treatments.       Assessment/Plan  Started treatment today with dynamic warm-up to prepare pt for interventions; tx today continued with end range mobility, neurodynamic mobility, and functional strengthening, which pt tolerated well and without complaint. Pt continues to have complaints in the aforementioned area, and continues to display limitations and impairments previously noted; pt will continue to benefit from ongoing skilled PT to help pt continue to progress towards current LTGs and return to PLOF.    Plan: Continue with current treatment plan and progressions to reach goals established and previous evaluation/assessment         Timed:         Manual Therapy:         mins  48864;     Therapeutic Exercise:    20     mins  85719;     Neuromuscular Usman:    10    mins  27841;    Therapeutic Activity:     10     mins  27084;     Gait Training:           mins  20055;     Ultrasound:          mins  90381;     Ionto                                   mins   59512  Self Care                            mins   08945  Canalith Repos         mins 38872    Un-Timed:  Electrical Stimulation:         mins  31583 ( );  Dry Needling          mins self-pay  Traction          mins 64915  Low Eval          Mins  63241  Mod Eval          Mins  26585  High Eval                            Mins  56683      Timed Treatment:   40   mins   Total Treatment:     40   mins      PT: Yordan Edwards PT     License Number: [id number]  Electronically signed by Yordan Edwards PT, 11/26/24, 7:44 AM EST

## 2024-11-27 ENCOUNTER — PATIENT ROUNDING (BHMG ONLY) (OUTPATIENT)
Dept: URGENT CARE | Facility: CLINIC | Age: 74
End: 2024-11-27
Payer: MEDICARE

## 2024-11-27 NOTE — ED NOTES
Thank you for letting us care for you during your recent visit at [facility]. We would love to hear about your experience with us.     We’re always looking for ways to make our patients’ experiences even better. Do you have any recommendations on ways we may improve?    I appreciate you taking the time to respond. Please be on the lookout for a survey about your recent visit from [facility] via text or email. We would greatly appreciate if you could fill that out and turn it back in. We want your voice to be heard and we value your feedback.     Thank you for choosing [facility] for your healthcare needs.

## 2024-11-27 NOTE — ED NOTES
Thank you for letting us care for you during your recent visit at [facility]. We would love to hear about your experience with us.     We’re always looking for ways to make our patients’ experiences even better. Do you have any recommendations on ways we may improve?    I appreciate you taking the time to respond. Please be on the lookout for a survey about your recent visit from [facility] via text or email. We would greatly appreciate if you could fill that out and turn it back in. We want your voice to be heard and we value your feedback.     Thank you for choosing [facility] for your healthcare needs.

## 2024-12-03 ENCOUNTER — TELEPHONE (OUTPATIENT)
Dept: PHYSICAL THERAPY | Facility: CLINIC | Age: 74
End: 2024-12-03

## 2024-12-06 ENCOUNTER — TREATMENT (OUTPATIENT)
Dept: PHYSICAL THERAPY | Facility: CLINIC | Age: 74
End: 2024-12-06
Payer: MEDICARE

## 2024-12-06 DIAGNOSIS — R53.1 WEAKNESS GENERALIZED: ICD-10-CM

## 2024-12-06 DIAGNOSIS — G89.29 CHRONIC LEFT SHOULDER PAIN: Primary | ICD-10-CM

## 2024-12-06 DIAGNOSIS — M25.512 CHRONIC LEFT SHOULDER PAIN: Primary | ICD-10-CM

## 2024-12-06 DIAGNOSIS — M54.12 RADICULOPATHY, CERVICAL REGION: ICD-10-CM

## 2024-12-06 DIAGNOSIS — M79.602 PAIN OF LEFT UPPER EXTREMITY: ICD-10-CM

## 2024-12-10 ENCOUNTER — TREATMENT (OUTPATIENT)
Dept: PHYSICAL THERAPY | Facility: CLINIC | Age: 74
End: 2024-12-10
Payer: MEDICARE

## 2024-12-10 DIAGNOSIS — M54.12 RADICULOPATHY, CERVICAL REGION: ICD-10-CM

## 2024-12-10 DIAGNOSIS — R53.1 WEAKNESS GENERALIZED: ICD-10-CM

## 2024-12-10 DIAGNOSIS — G89.29 CHRONIC LEFT SHOULDER PAIN: Primary | ICD-10-CM

## 2024-12-10 DIAGNOSIS — M25.512 CHRONIC LEFT SHOULDER PAIN: Primary | ICD-10-CM

## 2024-12-10 DIAGNOSIS — M79.602 PAIN OF LEFT UPPER EXTREMITY: ICD-10-CM

## 2024-12-10 PROCEDURE — 97530 THERAPEUTIC ACTIVITIES: CPT | Performed by: PHYSICAL THERAPIST

## 2024-12-10 PROCEDURE — 97112 NEUROMUSCULAR REEDUCATION: CPT | Performed by: PHYSICAL THERAPIST

## 2024-12-10 PROCEDURE — 97110 THERAPEUTIC EXERCISES: CPT | Performed by: PHYSICAL THERAPIST

## 2024-12-11 ENCOUNTER — OFFICE VISIT (OUTPATIENT)
Dept: ORTHOPEDIC SURGERY | Facility: CLINIC | Age: 74
End: 2024-12-11
Payer: MEDICARE

## 2024-12-11 VITALS — HEIGHT: 64 IN | BODY MASS INDEX: 22.06 KG/M2 | TEMPERATURE: 97.8 F | WEIGHT: 129.2 LBS

## 2024-12-11 DIAGNOSIS — G89.29 CHRONIC RIGHT SHOULDER PAIN: Primary | ICD-10-CM

## 2024-12-11 DIAGNOSIS — G89.29 CHRONIC LEFT SHOULDER PAIN: ICD-10-CM

## 2024-12-11 DIAGNOSIS — M25.511 CHRONIC RIGHT SHOULDER PAIN: Primary | ICD-10-CM

## 2024-12-11 DIAGNOSIS — M25.512 CHRONIC LEFT SHOULDER PAIN: ICD-10-CM

## 2024-12-11 PROCEDURE — 99212 OFFICE O/P EST SF 10 MIN: CPT | Performed by: ORTHOPAEDIC SURGERY

## 2024-12-11 NOTE — PROGRESS NOTES
CC:  Follow up bilateral shoulders    Ms. Urban is seen today in follow-up for both shoulders.  She reports that both seem to be doing really well.  She says that her left shoulder has recovered near full movement.  She is really happy with her progress in PT.  She says her right shoulder is much better but not quite 100%.  She can raise it up all the way overhead but she is hoping to get back full motion.  The posterior pain that she was having seems to be improved.  She rates her current pain at 1-2 out of 10.  She says there are times when it is 0.    Both shoulders were just briefly examined.  She does not have any tenderness or effusion in either shoulder.  Her motion is great on the left.  She is just missing a few levels of internal rotation.  On the right she can forward elevate to about 150 actively.  Passively she can get up to 160 and just briefly maintain it.  The trigger point does not seem to be tender any longer.    AP and orthogonal views right shoulder are ordered and reviewed today to evaluate her complaint.  These compared to previous x-rays.  Her previous fracture is healed.  The head is in varus.  She has posttraumatic osteoarthritis.  I do not see any other significant findings.    Assessment: 1.  Resolved right shoulder trigger point with posttraumatic osteoarthritis 2.  Resolving left shoulder adhesive capsulitis    Plan: She seems to be doing well.  I encouraged her to keep working on the PT.  Going forward, she can follow-up with me as needed.

## 2024-12-12 NOTE — PROGRESS NOTES
Physical Therapy Daily Note    Patient: Zina Urban   : 1950  Diagnosis/ICD-10 Code:  Chronic left shoulder pain [M25.512, G89.29]  Referring practitioner: Sergey Green MD  Date of Initial Visit: Type: THERAPY  Noted: 2024  Today's Date: 2024   Patient seen for 12 session  Location of Service: [place] [id number] [facility] - [address] [place] [address]       Visit Diagnoses:    ICD-10-CM ICD-9-CM   1. Chronic left shoulder pain  M25.512 719.41    G89.29 338.29   2. Radiculopathy, cervical region  M54.12 723.4   3. Pain of left upper extremity  M79.602 729.5   4. Weakness generalized  R53.1 780.79            Subjective  Zina Urban reported today that she's doing better, feels like she's still recovering from her illness the other week. Shoulder feels looser though    Objective   No functional measures updated at today's visit unless otherwise stated. For functional assessment and documentation of patient progressions referred to the assessment section.    See Exercise, Manual, and Modality Logs for complete treatments.       Assessment/Plan  Started treatment today with dynamic warm-up to prepare pt for interventions; tx today continued with overhead mobility and progressive OH strengthening, which pt tolerated well and without complaint. Added rest intervals were interwoven today d/t pt complains of fatigue from ongoing recovery from illness. HEP was reviewed and progressed today to patient's tolerance. Pt continues to have complaints in the aforementioned area, and continues to display limitations and impairments previously noted; pt will continue to benefit from ongoing skilled PT to help pt continue to progress towards current LTGs and return to PLOF.      Continue with current treatment plan and ongoing assessment; progress interventions to tolerance         Timed:         Manual Therapy:    -     mins  05594;     Therapeutic Exercise:    25      mins  80100;     Neuromuscular Usman:    15    mins  05150;    Therapeutic Activity:     15     mins  55367;     Gait Training:           mins  83412;     Ultrasound:          mins  02935;    Ionto                                   mins   56285  Self Care                            mins   34131  Canalith Repos         mins 52623    Un-Timed:  Electrical Stimulation:         mins  52154 ( );  Dry Needling     -     mins self-pay  Traction          mins 61434  Low Eval          Mins  09493  Mod Eval          Mins  23518  High Eval                            Mins  87782      Timed Treatment:   55   mins   Total Treatment:     55   mins      PT: Yordan Edwards PT     License Number: [id number]  Electronically signed by Yordan Edwards PT, 12/12/24, 3:00 PM EST

## 2024-12-13 ENCOUNTER — TELEPHONE (OUTPATIENT)
Dept: PHYSICAL THERAPY | Facility: CLINIC | Age: 74
End: 2024-12-13

## 2024-12-17 NOTE — PROGRESS NOTES
Re-Assessment / Re-Certification    Patient: Zina Urban   : 1950  Diagnosis/ICD-10 Code:  Chronic left shoulder pain [M25.512, G89.29]  Referring practitioner: Sergey Green MD  Date of Initial Visit: Type: THERAPY  Noted: 2024  Today's Date: 12/10/2024   Patient seen for 13 session  Location of Service: [place] [id number] [facility] - [address] [place] [address]       Visit Diagnoses:    ICD-10-CM ICD-9-CM   1. Chronic left shoulder pain  M25.512 719.41    G89.29 338.29   2. Radiculopathy, cervical region  M54.12 723.4   3. Pain of left upper extremity  M79.602 729.5   4. Weakness generalized  R53.1 780.79       Subjective:     Subjective Questionnaire: QuickDASH: 36%  Clinical Progress: improved  Home Program Compliance: Yes  Treatment has included: therapeutic exercise, neuromuscular re-education, manual therapy, and therapeutic activity  NPRS: Worst: 4/10, Average: 1-2/10    Subjective   Zina Urban reported today that she's doing better, shoulder feels looser    Objective   AROM (% of Full --- * denotes degrees in place of % --- + denotes tested to be WFL)                                                         -- Cervical Rotation Right:      80  Left:     80                      -- Thoracic Rotation Right       -           Left      -                         -- Cervical Flexion:                  85                                              -- Cervical Extension:              75                                                                                                                                                                                    AROM Shoulder (% of Full --- * denotes degrees in place of % --- + denotes tested to be WFL)                                        -- Shoulder Flexion Right:       90        Left:     85                      -- Shoulder Abduction Right:  80        Left:     80                                  --  Shoulder ER at Side Right: 85        Left:     85  -- Shoulder IR Behind Back:   85        Left:     85     UE MMT (0-5/5 --- + denotes WFL)  -- Shoulder Flexion Right:       4+/5    Left:     4/5  -- Shoulder Abduction Right:  4+/5     Left:     4/5  -- Shoulder ER at Side Right: 4+/5     Left:     4/5  -- Shoulder IR Behind Back:   -/5        Left:     -/5  -- Elbow Flexion Right:           4/5       Left:     4+/5  -- Elbow Extension Right        4+/5     Left:     5-/5  -- Wrist Flexion Right:             -/5        Left:     -/5  -- Wrist Extension Right:         -/5       Left:     -/5    See Exercise, Manual, and Modality Logs for complete treatment.     Assessment/Plan  Patient demonstrated improvements in mobility and functional strenght at today's re-evaluation and is progressing well towards current goals under current treatment plan. Tx today continue with emphasis RC strengthening and end range shoulder mobility, which the patient tolerated well and without complaint. VC and tactile cueing were provided with all interventions as needed during today's session. Patient's HEP was updated and progressed at the end of today's treatment with patient demonstrating good understanding Patient continues to have some limitations and impairments in the aforementioned areas and will continue to benefit from skilled PT to help patient regain functional mobility and return to PLOF by meeting all LTGs.      Progress toward previous goals: Partially Met    -    New Goals  Short-term goals (STG): -  Long-term goals (LTG): -      Recommendations: Continue as planned following plan implemented at [facility], with progressions towards goals est at IE  Timeframe: 1 month  Prognosis to achieve goals: good    PT: Yordan Edwards PT     License Number: [id number]  Electronically signed by Yordan Edwards PT, 12/17/24, 7:37 AM EST      Based upon review of the patient's progress and continued therapy plan, it is my medical opinion that  Zina Urban should [place] physical [place] [facility] [place] [facility] [place]  [facility]  [address] [facility], [address] [address] [phone].    Signature: __________________________________  Sergey Green MD  PHYSICIAN: Sergey Green MD  NPI: [id number]                                     DATE:     Timed:         Manual Therapy:    -     mins  21120;     Therapeutic Exercise:    20     mins  98439;     Neuromuscular Usman:    10    mins  98064;    Therapeutic Activity:     10     mins  90690;     Gait Training:           mins  14033;     Ultrasound:          mins  01758;    Ionto                                  mins   15807  Self Care                           mins   79135  Canalith Repos         mins 34011    Un-Timed:  Electrical Stimulation:          mins  67289 ( );  Dry Needling         mins self-pay  Traction         mins 04681  Re-Evaluation    -    Mins  34352      Timed Treatment:   40   mins   Total Treatment:     40   mins      Please sign and return via fax to [email or web address] . Thank you, [facility].

## 2024-12-18 ENCOUNTER — TREATMENT (OUTPATIENT)
Dept: PHYSICAL THERAPY | Facility: CLINIC | Age: 74
End: 2024-12-18
Payer: MEDICARE

## 2024-12-18 DIAGNOSIS — M54.12 RADICULOPATHY, CERVICAL REGION: ICD-10-CM

## 2024-12-18 DIAGNOSIS — R53.1 WEAKNESS GENERALIZED: ICD-10-CM

## 2024-12-18 DIAGNOSIS — M25.512 CHRONIC LEFT SHOULDER PAIN: Primary | ICD-10-CM

## 2024-12-18 DIAGNOSIS — G89.29 CHRONIC LEFT SHOULDER PAIN: Primary | ICD-10-CM

## 2024-12-18 DIAGNOSIS — M79.602 PAIN OF LEFT UPPER EXTREMITY: ICD-10-CM

## 2024-12-18 PROCEDURE — 97110 THERAPEUTIC EXERCISES: CPT | Performed by: PHYSICAL THERAPIST

## 2024-12-18 PROCEDURE — 97530 THERAPEUTIC ACTIVITIES: CPT | Performed by: PHYSICAL THERAPIST

## 2024-12-18 PROCEDURE — 97112 NEUROMUSCULAR REEDUCATION: CPT | Performed by: PHYSICAL THERAPIST

## 2024-12-26 NOTE — PROGRESS NOTES
Physical Therapy Daily Note    Patient: Zina Urban   : 1950  Diagnosis/ICD-10 Code:  Chronic left shoulder pain [M25.512, G89.29]  Referring practitioner: Goran Cuevas Sr., MD  Date of Initial Visit: Type: THERAPY  Noted: 2024  Today's Date: 2024   Patient seen for 14 session  Location of Service: [address] - [address] [place] [address]       Visit Diagnoses:    ICD-10-CM ICD-9-CM   1. Chronic left shoulder pain  M25.512 719.41    G89.29 338.29   2. Radiculopathy, cervical region  M54.12 723.4   3. Pain of left upper extremity  M79.602 729.5   4. Weakness generalized  R53.1 780.79            Subjective  Zina Urban reported today that she's doing a bit better this week    Objective   No functional measures updated at today's visit unless otherwise stated. For functional assessment and documentation of patient progressions referred to the assessment section.    See Exercise, Manual, and Modality Logs for complete treatments.       Assessment/Plan  Treatment today consisted of cervicothoracic mobility and overhead strengthening interventoins to address patients ongoing limitations and impairments in UE mobility and function. Patient tolerated today's treatment well and without complaint or adverse event. Patient continues to have previous noted areas and will continue to benefit from ongoing skilled physical therapy to reach current goals and return to PLOF.      Continue with current treatment plan and ongoing assessment; progress interventions to tolerance         Timed:         Manual Therapy:    -     mins  67271;     Therapeutic Exercise:    20     mins  91559;     Neuromuscular Usman:    10    mins  02874;    Therapeutic Activity:     10     mins  49861;     Gait Training:           mins  23831;     Ultrasound:          mins  97625;    Ionto                                   mins   81443  Self Care                            mins    51732  [facility]         mins 47914    Un-Timed:  Electrical Stimulation:         mins  11879 ( );  Dry Needling     -     mins self-pay  Traction          mins 50401  Low Eval          Mins  05657  Mod Eval          Mins  79598  High Eval                            Mins  58049      Timed Treatment:   40   mins   Total Treatment:     45   mins      PT: Yordan Edwards PT     License Number: [id number]  Electronically signed by Yordan Edwards PT, 12/26/24, 12:23 PM EST

## 2024-12-31 ENCOUNTER — TREATMENT (OUTPATIENT)
Dept: PHYSICAL THERAPY | Facility: CLINIC | Age: 74
End: 2024-12-31
Payer: MEDICARE

## 2024-12-31 DIAGNOSIS — R53.1 WEAKNESS GENERALIZED: ICD-10-CM

## 2024-12-31 DIAGNOSIS — M79.602 PAIN OF LEFT UPPER EXTREMITY: ICD-10-CM

## 2024-12-31 DIAGNOSIS — M25.512 CHRONIC LEFT SHOULDER PAIN: Primary | ICD-10-CM

## 2024-12-31 DIAGNOSIS — G89.29 CHRONIC LEFT SHOULDER PAIN: Primary | ICD-10-CM

## 2024-12-31 DIAGNOSIS — M54.12 RADICULOPATHY, CERVICAL REGION: ICD-10-CM

## 2024-12-31 PROCEDURE — 97530 THERAPEUTIC ACTIVITIES: CPT | Performed by: PHYSICAL THERAPIST

## 2024-12-31 PROCEDURE — 97112 NEUROMUSCULAR REEDUCATION: CPT | Performed by: PHYSICAL THERAPIST

## 2024-12-31 PROCEDURE — 97110 THERAPEUTIC EXERCISES: CPT | Performed by: PHYSICAL THERAPIST

## 2025-01-03 NOTE — PROGRESS NOTES
Re-Assessment / Re-Certification    Patient: Zina Urban   : 1950  Diagnosis/ICD-10 Code:  Chronic left shoulder pain [M25.512, G89.29]  Referring practitioner: Goran Cuevas Sr., MD  Date of Initial Visit: Type: THERAPY  Noted: 2024  Today's Date: 2024   Patient seen for 15 session  Location of Service: [address] - [address] [place] [address]       Visit Diagnoses:    ICD-10-CM ICD-9-CM   1. Chronic left shoulder pain  M25.512 719.41    G89.29 338.29   2. Radiculopathy, cervical region  M54.12 723.4   3. Pain of left upper extremity  M79.602 729.5   4. Weakness generalized  R53.1 780.79       Subjective:     Subjective Questionnaire: QuickDASH: 28%  Clinical Progress: improved  Home Program Compliance: Yes  Treatment has included: therapeutic exercise, neuromuscular re-education, manual therapy, and therapeutic activity  NPRS: Worst: 4/10, Average: 2/10    Subjective   Zina Urban reported today that she's doing better, recent illness has hindered her progress in her opinion though    Objective   AROM (% of Full --- * denotes degrees in place of % --- + denotes tested to be WFL)                                                         -- Cervical Rotation Right:      70        Left:     75                      -- Thoracic Rotation Right       -           Left      -                         -- Cervical Flexion:                  85                                              -- Cervical Extension:              70                                                                                                                                                                                    AROM Shoulder (% of Full --- * denotes degrees in place of % --- + denotes tested to be WFL)                                        -- Shoulder Flexion Right:       90        Left:     90                     -- Shoulder Abduction Right:  85        Left:      85                                  -- Shoulder ER at Side Right: 85        Left:     85  -- Shoulder IR Behind Back:   85        Left:     85     UE MMT (0-5/5 --- + denotes WFL)  -- Shoulder Flexion Right:       4+/5    Left:     4/5  -- Shoulder Abduction Right:  4+/5     Left:     4/5  -- Shoulder ER at Side Right: 4+/5     Left:     4/5  -- Shoulder IR Behind Back:   -/5        Left:     -/5  -- Elbow Flexion Right:           4/5       Left:     4+/5  -- Elbow Extension Right        4+/5     Left:     5-/5  -- Wrist Flexion Right:             -/5        Left:     -/5  -- Wrist Extension Right:         -/5       Left:     -/5    See Exercise, Manual, and Modality Logs for complete treatment.     Assessment/Plan  Patient demonstrated improvements in mobility and functional strength at today's re-evaluation and is progressing well towards current goals under current treatment plan. Tx today continue with emphasis of progressive strengthening, which the patient tolerated well and without complaint. VC and tactile cueing were provided with all interventions as needed during today's session. Patient's HEP was updated and progressed at the end of today's treatment with patient demonstrating good understanding Patient continues to have some limitations and impairments in the aforementioned areas and will continue to benefit from skilled PT to help patient regain functional mobility and return to PLOF by meeting all LTGs.      Progress toward previous goals: Partially Met        New Goals  Short-term goals (STG):   Long-term goals (LTG):       Recommendations: Continue as planned following plan implemented at IE, with progressions towards goals est at IE  Timeframe: 1 month  Prognosis to achieve goals: good    PT: Yordan Edwards PT     License Number: [id number]  Electronically signed by Yordan Edwards PT, 01/03/25, 4:03 PM EST      Based upon review of the patient's progress and continued therapy plan, it is my medical  opinion that Zina Urban should continue physical therapy treatment at [facility] PHY THER ESTPT  [facility]  [address] [facility], [address] [address] [phone].    Signature: __________________________________  Goran Cuevas Sr., MD  PHYSICIAN: Goran Cuevas Sr., MD  NPI:                                      DATE:     Timed:         Manual Therapy:         mins  93575;     Therapeutic Exercise:    15     mins  02845;     Neuromuscular Usman:    10    mins  00632;    Therapeutic Activity:     10     mins  15368;     Gait Training:           mins  63912;     Ultrasound:          mins  38112;    Ionto                                  mins   50236  Self Care                           mins   73884  Canalith Repos         mins 40082    Un-Timed:  Electrical Stimulation:          mins  70434 ( );  Dry Needling         mins self-pay  Traction         mins 08459  Re-Evaluation        Mins  33990      Timed Treatment:   40   mins   Total Treatment:     50   mins      Please sign and return via fax to [email or web address] . Thank you, [facility].